# Patient Record
Sex: MALE | Race: WHITE | NOT HISPANIC OR LATINO | Employment: FULL TIME | URBAN - METROPOLITAN AREA
[De-identification: names, ages, dates, MRNs, and addresses within clinical notes are randomized per-mention and may not be internally consistent; named-entity substitution may affect disease eponyms.]

---

## 2018-01-10 ENCOUNTER — APPOINTMENT (OUTPATIENT)
Dept: GENERAL RADIOLOGY | Facility: HOSPITAL | Age: 23
End: 2018-01-10

## 2018-01-10 ENCOUNTER — HOSPITAL ENCOUNTER (EMERGENCY)
Facility: HOSPITAL | Age: 23
Discharge: HOME | End: 2018-01-10
Attending: EMERGENCY MEDICINE | Admitting: EMERGENCY MEDICINE

## 2018-01-10 VITALS
HEART RATE: 66 BPM | BODY MASS INDEX: 26.52 KG/M2 | TEMPERATURE: 98.1 F | WEIGHT: 175 LBS | DIASTOLIC BLOOD PRESSURE: 66 MMHG | SYSTOLIC BLOOD PRESSURE: 138 MMHG | RESPIRATION RATE: 15 BRPM | HEIGHT: 68 IN | OXYGEN SATURATION: 98 %

## 2018-01-10 DIAGNOSIS — S93.491A SPRAIN OF ANTERIOR TALOFIBULAR LIGAMENT OF RIGHT ANKLE, INITIAL ENCOUNTER: Primary | ICD-10-CM

## 2018-01-10 PROCEDURE — 73610 X-RAY EXAM OF ANKLE: CPT

## 2018-01-10 PROCEDURE — 99283 EMERGENCY DEPT VISIT LOW MDM: CPT

## 2018-01-10 RX ORDER — HYDROCODONE BITARTRATE AND ACETAMINOPHEN 5; 325 MG/1; MG/1
1 TABLET ORAL EVERY 6 HOURS PRN
Qty: 15 TABLET | Refills: 0 | OUTPATIENT
Start: 2018-01-10 | End: 2021-01-12

## 2018-01-10 RX ORDER — IBUPROFEN 800 MG/1
800 TABLET ORAL ONCE
Status: COMPLETED | OUTPATIENT
Start: 2018-01-10 | End: 2018-01-10

## 2018-01-10 RX ORDER — HYDROCODONE BITARTRATE AND ACETAMINOPHEN 7.5; 325 MG/1; MG/1
1 TABLET ORAL ONCE
Status: DISCONTINUED | OUTPATIENT
Start: 2018-01-10 | End: 2018-01-10

## 2018-01-10 RX ADMIN — IBUPROFEN 800 MG: 800 TABLET, FILM COATED ORAL at 11:07

## 2018-01-10 NOTE — ED TRIAGE NOTES
"Pt reports right foot injury yesterday while working, states he \"rolled it or snapped it.\" pt c/o swelling, bruising and pain  "

## 2018-01-10 NOTE — ED PROVIDER NOTES
EMERGENCY DEPARTMENT ENCOUNTER    CHIEF COMPLAINT  Chief Complaint: R ankle pain  History given by: Pt  History limited by: Nothing  Room Number: Room/bed info not found  PMD: No Known Provider      HPI:  Pt is a 22 y.o. male who presents complaining of R ankle pain, onset yesterday at work. Pt reports he was going down a ladder when he missed a rung and twisted his R ankle. He denies any numbness or tingling to the area. He reports that he was able to ambulate after the incident.     Duration:  Yesterday  Onset: sudden  Timing: constant  Location: R ankle  Radiation: none  Quality: pain  Intensity/Severity: mild  Progression: unchanged  Associated Symptoms: none stated  Aggravating Factors: movement  Alleviating Factors: none  Previous Episodes: none  Treatment before arrival: None stated PTA    PAST MEDICAL HISTORY  Active Ambulatory Problems     Diagnosis Date Noted   • No Active Ambulatory Problems     Resolved Ambulatory Problems     Diagnosis Date Noted   • No Resolved Ambulatory Problems     Past Medical History:   Diagnosis Date   • Allergic        PAST SURGICAL HISTORY  Past Surgical History:   Procedure Laterality Date   • BLADDER SURGERY     • BLADDER SURGERY      obstruction   • KNEE ACL RECONSTRUCTION Left    • KNEE ACL RECONSTRUCTION         FAMILY HISTORY  Family History   Problem Relation Age of Onset   • No Known Problems Mother    • No Known Problems Father        SOCIAL HISTORY  Social History     Social History   • Marital status: Single     Spouse name: N/A   • Number of children: N/A   • Years of education: N/A     Occupational History   • Not on file.     Social History Main Topics   • Smoking status: Never Smoker   • Smokeless tobacco: Former User   • Alcohol use Yes   • Drug use: No   • Sexual activity: Yes     Partners: Female     Other Topics Concern   • Not on file     Social History Narrative       ALLERGIES  Morphine and related    REVIEW OF SYSTEMS  Review of Systems   Constitutional:  Negative for fever.   Respiratory: Negative for shortness of breath.    Cardiovascular: Negative for chest pain.   Musculoskeletal: Positive for arthralgias (R ankle pain).   Neurological: Negative for numbness.        Pt denies any tingling in his RLE       PHYSICAL EXAM  ED Triage Vitals   Temp Heart Rate Resp BP SpO2   01/10/18 0845 01/10/18 0845 01/10/18 0845 01/10/18 0849 01/10/18 0845   97.2 °F (36.2 °C) 75 16 138/66 97 %      Temp src Heart Rate Source Patient Position BP Location FiO2 (%)   01/10/18 0845 -- -- -- --   Tympanic           Physical Exam   Constitutional: No distress.   HENT:   Head: Normocephalic and atraumatic.   Eyes: EOM are normal. Pupils are equal, round, and reactive to light.   Musculoskeletal:        Right ankle: He exhibits decreased range of motion (with inversion), swelling and ecchymosis (laterally). He exhibits no deformity and no laceration. Tenderness. Lateral malleolus and medial malleolus tenderness found.   No R knee or R tibial tenderness   Neurological: He is alert. He has normal sensation.   Skin: Skin is warm and dry.   Nursing note and vitals reviewed.      RADIOLOGY  XR Ankle 3+ View Right     Soft tissue swelling, but no acute fracture.           I ordered the above noted radiological studies. Interpreted by radiologist. Reviewed by me in PACS.       PROCEDURES  Procedures      PROGRESS AND CONSULTS  ED Course     1052 - Norco ordered for pain.     1053 - Rechecked pt. Pt is resting comfortably. Informed pt of the XR which shows soft tissue swelling but no acute fracture. D/w pt the plan to discharge home with an orthopedic boot and pain medication and a follow up with Dr. Mooney (Orthopedics). Pt understands and agrees with plan. All questions answered.     1055 - Walking boot ordered.     MEDICAL DECISION MAKING  Results were reviewed/discussed with the patient and they were also made aware of online access. Pt also made aware that some labs, such as cultures, will not  be resulted during ER visit and follow up with PMD is necessary.     MDM  Number of Diagnoses or Management Options  Sprain of anterior talofibular ligament of right ankle, initial encounter:      Amount and/or Complexity of Data Reviewed  Tests in the radiology section of CPT®: ordered and reviewed (XR R ankle - Soft tissue swelling, but no acute fracture. )           DIAGNOSIS  Final diagnoses:   Sprain of anterior talofibular ligament of right ankle, initial encounter       DISPOSITION  DISCHARGE    Patient discharged in stable condition.    Reviewed implications of results, diagnosis, meds, responsibility to follow up, warning signs and symptoms of possible worsening, potential complications and reasons to return to ER.    Patient/Family voiced understanding of above instructions.    Discussed plan for discharge, as there is no emergent indication for admission.  Pt/family is agreeable and understands need for follow up and repeat testing.  Pt is aware that discharge does not mean that nothing is wrong but it indicates no emergency is present that requires admission and they must continue care with follow-up as given below or physician of their choice.     FOLLOW-UP  RANJANA Mooney MD  [address] [address]  [phone]    Schedule an appointment as soon as possible for a visit           Medication List      New Prescriptions          HYDROcodone-acetaminophen 5-325 MG per tablet   Commonly known as:  NORCO   Take 1 tablet by mouth Every 6 (Six) Hours As Needed for Moderate Pain .         Stop          Loratadine 10 MG capsule       MethylPREDNISolone 4 MG tablet   Commonly known as:  MEDROL (PATSY)       predniSONE 10 MG tablet   Commonly known as:  DELTASONE               Latest Documented Vital Signs:  As of 10:57 AM  BP- 138/66 HR- 75 Temp- 97.2 °F (36.2 °C) (Tympanic) O2 sat- 97%    --  Documentation assistance provided by ramon Curtis for Dr. Castro.  Information  recorded by the scribe was done at my direction and has been verified and validated by me.            Jayce Curtis  01/10/18 1053       Chun Castro MD  01/10/18 6192

## 2018-01-10 NOTE — ED NOTES
Pt to ED for right ankle injury yesterday, states rolled his ankle at work. Pt has bruising and swelling to right ankle. Skin p/w/d, pedal pulses palpable     Candie Desai RN  01/10/18 0993

## 2018-01-19 ENCOUNTER — APPOINTMENT (OUTPATIENT)
Dept: GENERAL RADIOLOGY | Facility: HOSPITAL | Age: 23
End: 2018-01-19

## 2018-01-19 PROCEDURE — 73630 X-RAY EXAM OF FOOT: CPT | Performed by: GENERAL PRACTICE

## 2018-01-19 PROCEDURE — 73610 X-RAY EXAM OF ANKLE: CPT | Performed by: GENERAL PRACTICE

## 2018-08-19 ENCOUNTER — APPOINTMENT (OUTPATIENT)
Dept: GENERAL RADIOLOGY | Facility: HOSPITAL | Age: 23
End: 2018-08-19

## 2018-08-19 PROCEDURE — 73140 X-RAY EXAM OF FINGER(S): CPT | Performed by: FAMILY MEDICINE

## 2022-08-05 ENCOUNTER — PATIENT ROUNDING (BHMG ONLY) (OUTPATIENT)
Dept: SPORTS MEDICINE | Facility: CLINIC | Age: 27
End: 2022-08-05

## 2022-08-05 ENCOUNTER — OFFICE VISIT (OUTPATIENT)
Dept: SPORTS MEDICINE | Facility: CLINIC | Age: 27
End: 2022-08-05

## 2022-08-05 VITALS
HEART RATE: 86 BPM | SYSTOLIC BLOOD PRESSURE: 137 MMHG | BODY MASS INDEX: 31.07 KG/M2 | HEIGHT: 68 IN | WEIGHT: 205 LBS | TEMPERATURE: 98.1 F | OXYGEN SATURATION: 97 % | DIASTOLIC BLOOD PRESSURE: 79 MMHG

## 2022-08-05 DIAGNOSIS — S93.412A SPRAIN OF CALCANEOFIBULAR LIGAMENT OF LEFT ANKLE, INITIAL ENCOUNTER: ICD-10-CM

## 2022-08-05 DIAGNOSIS — S93.492A SPRAIN OF ANTERIOR TALOFIBULAR LIGAMENT OF LEFT ANKLE, INITIAL ENCOUNTER: Primary | ICD-10-CM

## 2022-08-05 DIAGNOSIS — M76.72 PERONEAL TENDINITIS OF LEFT LOWER EXTREMITY: ICD-10-CM

## 2022-08-05 PROCEDURE — 99213 OFFICE O/P EST LOW 20 MIN: CPT | Performed by: STUDENT IN AN ORGANIZED HEALTH CARE EDUCATION/TRAINING PROGRAM

## 2022-08-05 NOTE — PROGRESS NOTES
August 5, 2022    A Welcome Card has been sent to the patient for PATIENT ROUNDING with [facility]

## 2022-08-08 NOTE — PROGRESS NOTES
"  Chief Complaint   Patient presents with   • Left Ankle - Initial Evaluation     Injury 8/3/22 playing softball       History of Present Illness  Fab is a 27 y.o. year old male  here today for left ankle pain.  Injury occurred on 8/3/2022 while playing softball when he slid into third base and rolled his ankle.  His lead foot (L) got caught on the bag causing an inversion and hyper-plantarflexion injury.  He reported having pain immediately but was able to continue playing.  Pain persisted over the next few days without significant improvement.  Pain is currently rated 2/10 and described as an aching pain worse on the inferolateral aspect.  He does admit to associated swelling and bruising, denies any numbness or tingling.  He has been wearing a lace up ankle brace that he purchased at the pharmacy which he states provides a lot of support and allows him to ambulate without significant limp.  He presents today using crutches, but states he is only been using them to the house for extended times.  He took ibuprofen 400 mg 3 times over the past couple days but nothing consistently.  He has a history of right ankle sprain along with right ACL MCL and PCL injury requiring surgery.    He currently works as an , mainly doing corporate installations.  He just had a baby girl 10 days ago so is home on parental leave, scheduled to return to work on 8/8/2022.    Review of Systems   All other systems reviewed and are negative.      /79   Pulse 86   Temp 98.1 °F (36.7 °C)   Ht 172.7 cm (68\")   Wt 93 kg (205 lb)   SpO2 97%   BMI 31.17 kg/m²        Physical Exam  Vital signs reviewed.   General: Well developed, well nourished; No acute distress.  Eyes: conjunctiva clear; pupils equally round and reactive  ENT: external ears and nose atraumatic; hearing normal  CV: no peripheral edema, 2+ distal pulses  Resp: normal respiratory effort, no use of accessory muscles  Skin: normal color and " pigmentation; no rashes or wounds; normal turgor  Psych: alert and oriented; mood and affect appropriate; recent and remote memory intact  Neuro: sensation to light touch intact    MSK Exam:  The left ankle is without obvious signs of acute bony deformity.  Swelling and ecchymosis of the ankle, with some dependent bruising at the heel.  There is no tenderness to the medial joint line, medial malleolus, or distal tibia. There is tenderness to the lateral joint line, worse posteriorly, and lateral malleolus. There is no bony crepitus or step-off. There is no midfoot or forefoot tenderness.  There is soft tissue tenderness of the posterior tibial and peroneal tendons, however area of worst tenderness is over the ATF and CF ligaments.  Active range of motion is mildly diminished when compared to the right, with pain at endrange of inversion and eversion. Instability test are negative for appreciable laxity with anterior drawer, talar tilt and eversion stress tests, however there is pain laterally with talar tilt. Tibia-fibula squeeze, calcaneal squeeze, and forefoot squeeze tests are negative. Strength is 4/5.  He is able to walk and single-leg stand, but with pain on the medial and lateral aspects. The opposite ankle is otherwise normal and stable. Gait is antalgic without the brace.  With the brace on his gait and pain improves, especially after a few steps.      Left Ankle X-Ray  Indication: Pain  Views: AP, Lateral, Mortise  Findings: There is a metallic BB noted from previous injury  No fracture  No bony lesion  Soft tissues normal  Normal joint spaces  No prior studies available for comparison.      Assessment and Plan  Diagnoses and all orders for this visit:    1. Sprain of anterior talofibular ligament of left ankle, initial encounter (Primary)    2. Sprain of calcaneofibular ligament of left ankle, initial encounter    3. Peroneal tendinitis of left lower extremity    Patient is a 27-year-old male here today  with chief complaint of left ankle pain that began after an inversion ankle injury while playing softball.  Images and exam findings reviewed with the patient and are consistent with a lateral ankle sprain.  We recommended conservative management.  He should continue with the use of his lace up ankle brace.  He may weight-bear as tolerated, but instructed him that if he has significant pain that is causing him to ambulate with a limp, he should use crutches and partially weight-bear until symptoms improve.  He should avoid strenuous activity including prolonged standing or walking.  He should ice and elevate for swelling.  Recommended the use of more regularly scheduled ibuprofen for the next 7 days then as needed.  He should work on gentle ankle range of motion exercises.  A note was provided for work allowing him to return to light duty for the next 2 weeks with instructions to not climb up on ladders or walk/stand for prolonged periods of time.  He may return to full duty without restrictions on 8/22/2022.  We will follow-up in 4 weeks or sooner as needed.  All of his questions were answered and he is agreeable with the plan.      Dictated utilizing Dragon dictation.

## 2023-05-22 ENCOUNTER — OFFICE VISIT (OUTPATIENT)
Dept: ORTHOPEDIC SURGERY | Facility: CLINIC | Age: 28
End: 2023-05-22
Payer: COMMERCIAL

## 2023-05-22 VITALS — HEIGHT: 68 IN | TEMPERATURE: 98.4 F | BODY MASS INDEX: 31.07 KG/M2 | WEIGHT: 205 LBS

## 2023-05-22 DIAGNOSIS — M25.561 RIGHT KNEE PAIN, UNSPECIFIED CHRONICITY: Primary | ICD-10-CM

## 2023-05-22 PROCEDURE — 99203 OFFICE O/P NEW LOW 30 MIN: CPT | Performed by: ORTHOPAEDIC SURGERY

## 2023-05-22 NOTE — PROGRESS NOTES
Patient: Fab Reaves    YOB: 1995    Medical Record Number: [id number]    Chief Complaints:  Right knee injury    History of Present Illness:     28 y.o. male patient who presents for evaluation of a right knee injury.  The injury occurred on May 17.  He was playing softball and rounding second base when his knee buckled and gave out.  He suffered a knee dislocation.  He was taken to [facility] and a closed reduction was performed.  They obtained a CT scan to rule out arterial injury and admitted him overnight for observation.  His knee has been in a brace since the injury and he has been using crutches with strict nonweightbearing on the right lower extremity.  He says his pain is minimal at this point.  He is just taking over-the-counter medicines for what little discomfort he has.  It really only hurts if he tries to move or walk on it.  He reports significant swelling.  He reports normal motor and sensory function in his foot.  Of note he has a history of a previous left knee dislocation but when I asked him to describe this it sounds like it was probably a patellar dislocation.  He does have a history of a previous left knee ACL reconstruction as well.  He never had problems with the right knee until the recent injury.  Denies any other injuries or complaints.    Allergies:   Allergies   Allergen Reactions   • Morphine And Related Arrhythmia       Home Medications:      Current Outpatient Medications:   •  ibuprofen (ADVIL,MOTRIN) 200 MG tablet, Take 200 mg by mouth every 6 (six) hours as needed for mild pain (1-3)., Disp: , Rfl:     Past Medical History:   Diagnosis Date   • Allergic        Past Surgical History:   Procedure Laterality Date   • BLADDER SURGERY     • BLADDER SURGERY      obstruction   • KNEE ACL RECONSTRUCTION Left    • KNEE ACL RECONSTRUCTION         Social History     Occupational History   • Not on file   Tobacco Use   • Smoking status: Never   • Smokeless tobacco:  "Former   Substance and Sexual Activity   • Alcohol use: Yes   • Drug use: No   • Sexual activity: Yes     Partners: Female      Social History     Social History Narrative   • Not on file       Family History   Problem Relation Age of Onset   • No Known Problems Mother    • No Known Problems Father        Review of Systems:      Constitutional: Denies fever, shaking or chills   Eyes: Denies change in visual acuity   HEENT: Denies nasal congestion or sore throat   Respiratory: Denies cough or shortness of breath   Cardiovascular: Denies chest pain or edema  Endocrine: Denies tremors, palpitations, intolerance of heat or cold, polyuria, polydipsia.  GI: Denies abdominal pain, nausea, vomiting, bloody stools or diarrhea  : Denies frequency, urgency, incontinence, retention, or nocturia.  Musculoskeletal: Denies numbness, tingling or loss of motor function except as above  Integument: Denies rash, lesion or ulceration   Neurologic: Denies headache or focal weakness, deficits  Heme: Denies spontaneous or excessive bleeding, epistaxis, hematuria, melena, fatigue, enlarged or tender lymph nodes.      All other pertinent positives and negatives as noted above in HPI.    Physical Exam: 28 y.o. male    Vitals:    05/22/23 1313   Temp: 98.4 °F (36.9 °C)   TempSrc: Temporal   Weight: 93 kg (205 lb)   Height: 172.7 cm (67.99\")       General:  Patient is awake and alert.  Appears in no acute distress or discomfort.    Psych:  Affect and demeanor are appropriate.    Eyes:  Conjunctiva and sclera appear grossly normal.  Eyes track well and EOM seem to be intact.    Ears:  No gross abnormalities.  Hearing adequate for the exam.    Cardiovascular:  Regular rate and rhythm.    Lungs:  Good chest expansion.  Breathing unlabored.    Lymph:  No palpable masses or adenopathy in the right lower extremity    Extremities: His right knee is examined.  He has a massive effusion.  Skin is benign and intact.  His calf is just a little " edematous but not at all firm.  He has a negative Homans.  He is markedly tender along the medial aspect of his knee, less so along the lateral side.  I did not take the brace off or perform any extensive ligamentous exam today.  He can plantarflex and dorsiflex his ankle and toes with good strength.  He reports intact sensation.  He has palpable posterior tibial and dorsalis pedis pulses.  He has good skin turgor and brisk capillary refill.         Radiology:   His outside x-rays of the right knee including pre and postreduction films are reviewed.  He has what appears to be a knee dislocation which was subsequently reduced.    Assessment/Plan: Recent right knee dislocation with probable multiligamentous injury    I am going to send him for an MRI.  I will call him as soon as I see the results.  I recommend he continue use of the brace for now.  I recommend he continue use of the crutches and strict nonweightbearing.    Timi Blair MD    05/22/2023

## 2023-05-23 ENCOUNTER — TELEPHONE (OUTPATIENT)
Dept: ORTHOPEDIC SURGERY | Facility: CLINIC | Age: 28
End: 2023-05-23

## 2023-05-23 NOTE — TELEPHONE ENCOUNTER
Caller: FALLON DANIELSON    Relationship: SE;F    Best call back number: [phone]    What form or medical record are you requesting: MRI RIGHT KNEE WITHOUT CONTRAST    Who is requesting this form or medical record from you: [facility]    How would you like to receive the form or medical records (pick-up, mail, fax): ON FILE      Timeframe paperwork needed: ASAP    Additional notes: [facility] HAS NOT RECEIVED ORDER.

## 2023-05-24 NOTE — TELEPHONE ENCOUNTER
Caller: Fab Reaves    Relationship to patient: Self    Best call back number:     Patient is needing: UNABLE TO WARM TRANSFER.  PATIENT IS TRYING  TO REACH SAADIA TO GET MRI SCHEDULED AND IS FRUSTRATED BECAUSE HE STATED HE HASNT BEEN CONTACTED AND HE WANTS TO GET SCHEDULE RIGHT AWAY.  WOULD LIKE TO SPEAK WITH JONATHAN.  IF WE CAN GET ORDER SENT AND CONTACT HIM TO LET HIM KNOW ITS DONE THAT SHOULD HELP SITUATION

## 2023-05-24 NOTE — TELEPHONE ENCOUNTER
Caller: Fab Reaves    Relationship to patient: Self    Best call back number: [phone]    Patient is needing: PATIENT IS NEEDING TO SPEAK TO SOMEONE IN THE CLINICAL STAFF ABOUT HIS MRI OF HIS RIGHT KNEE WITHOUT CONTRAST.  PATIENT IS UPSET AND NEEDS TO SPEAK TO SOMEONE, BECAUSE [facility] HASN'T BEEN RECEIVED. PATIENT NEEDS A CALL BACK.  UNABLE TO WARM TRANSFER.

## 2023-05-24 NOTE — TELEPHONE ENCOUNTER
Caller: Fab Reaves    Relationship to patient: Self    Best call back number: [phone]    Patient is needing: PATIENT WAS CALLING WANTING TO TALK TO SAADIA OR THE PRACTICE MANAGER TO DISCUSS HIS MRI GETTING SCHEDULED AT [facility]. PATIENT'S MRI ORDER WAS PUT IN TWO DAYS AGO BUT HE IS TRYING TO GET THIS SCHEDULED ASAP. PATIENT WOULD LIKE A CALL BACK TO DISCUSS THIS. THANK YOU!

## 2023-05-25 ENCOUNTER — PATIENT ROUNDING (BHMG ONLY) (OUTPATIENT)
Dept: ORTHOPEDIC SURGERY | Facility: CLINIC | Age: 28
End: 2023-05-25
Payer: COMMERCIAL

## 2023-05-25 DIAGNOSIS — M25.561 RIGHT KNEE PAIN, UNSPECIFIED CHRONICITY: ICD-10-CM

## 2023-05-25 NOTE — PROGRESS NOTES
A Share0 Message has been sent to the patient for PATIENT ROUNDING with [facility]

## 2023-05-26 ENCOUNTER — TELEPHONE (OUTPATIENT)
Dept: ORTHOPEDIC SURGERY | Facility: CLINIC | Age: 28
End: 2023-05-26
Payer: COMMERCIAL

## 2023-05-26 NOTE — TELEPHONE ENCOUNTER
Have made the patient an appointment to see Dr. Angel España with [facility] sports medicine clinic on Wednesday, May 31 at 130.  Office notes and MRI report as well as CT report has been faxed to Dr. España's office at [phone].  All the information has been given to the patient.  He is also been instructed to  his MRI disc from [facility] as well as his CD with x-rays and CT from [facility] to take with him to the appointment.

## 2023-05-26 NOTE — TELEPHONE ENCOUNTER
I called him about the MRI results.  I explained the findings.  He has a complex multiligamentous injury.  I am happy to help him but I would plan for a multistage procedure.  I would probably let the MCL heal and then plan to address his PCL and meniscus and then I would probably follow that up with ACL reconstruction.  I told him that the alternative would be to send him to [place].  I explained that one of the world experts in multiligamentous knee reconstructions is Dr. España.  We can get him over to Dr. España for further care.  He would prefer to go that route.  I will have my office make the necessary arrangements.

## 2023-07-26 ENCOUNTER — TREATMENT (OUTPATIENT)
Dept: PHYSICAL THERAPY | Facility: CLINIC | Age: 28
End: 2023-07-26
Payer: COMMERCIAL

## 2023-07-26 DIAGNOSIS — Z98.890 S/P ACL RECONSTRUCTION: Primary | ICD-10-CM

## 2023-07-26 DIAGNOSIS — R29.898 DECREASED STRENGTH OF LOWER EXTREMITY: ICD-10-CM

## 2023-07-26 DIAGNOSIS — R26.89 IMPAIRED GAIT AND MOBILITY: ICD-10-CM

## 2023-07-26 DIAGNOSIS — S89.91XD PCL INJURY, RIGHT, SUBSEQUENT ENCOUNTER: ICD-10-CM

## 2023-07-26 DIAGNOSIS — Z98.890 S/P LATERAL MENISCUS REPAIR OF RIGHT KNEE: ICD-10-CM

## 2023-07-26 DIAGNOSIS — Z98.890 S/P MCL REPAIR: ICD-10-CM

## 2023-07-26 DIAGNOSIS — M25.661 DECREASED RANGE OF MOTION (ROM) OF RIGHT KNEE: ICD-10-CM

## 2023-07-26 DIAGNOSIS — Z98.890 S/P MEDIAL MENISCUS REPAIR OF RIGHT KNEE: ICD-10-CM

## 2023-07-26 NOTE — PROGRESS NOTES
Physical Therapy Progress Note    [facility]  [address]  [address]  [place] [address]      Patient: Fab Reaves   : 1995  Referring practitioner: Angel España MD  Date of Initial Visit: Type: THERAPY  Noted: 2023  Today's Date: 2023  Patient seen for 11 sessions         Fab Reaves reports: he's doing well with the new brace, just getting used to putting more weight on it.        Subjective     Objective   See Exercise, Manual, and Modality Logs for complete treatment.       Assessment/Plan  Fab continues to be limited to 80 degrees of AAROM knee flexion and 4-5 degrees away from full knee extension. He exhibits mild quad lag with straight leg raise and needs cueing for heel to toe gait pattern during level surface ambulation. We will continue to work on progressing his gait since he is now WBAT, he could tolerate ambulation with one crutch well today. HEP updated with extensive knee ROM exercises today.  Visit Diagnoses:    ICD-10-CM ICD-9-CM   1. S/P ACL reconstruction  Z98.890 V45.89   2. S/P MCL repair  Z98.890 V45.89   3. Decreased range of motion (ROM) of right knee  M25.661 719.56   4. PCL injury, right, subsequent encounter  S89.91XD V58.89     959.7   5. S/P medial meniscus repair of right knee  Z98.890 V45.89   6. S/P lateral meniscus repair of right knee  Z98.890 V45.89   7. Decreased strength of lower extremity  R29.898 729.89   8. Impaired gait and mobility  R26.89 781.2       Progress per Plan of Care           Timed:  Manual Therapy:    10     mins  78176;  Therapeutic Exercise:    25     mins  72694;     Neuromuscular Pollo:        mins  82057;    Therapeutic Activity:    10      mins  44164;     Gait Training:      10     mins  84505;     Ultrasound:          mins  89956;    Electrical Stimulation:         mins  82700 ( );    Untimed:  Electrical Stimulation:         mins  34472 ( );  Mechanical Traction:         mins   82518;     Timed Treatment:   55   mins   Total Treatment:     55   mins  Gerald Lyn PT, DPT, OCS  Physical Therapist   [place] License #[id number]

## 2023-07-26 NOTE — PATIENT INSTRUCTIONS
Access Code: [email or web address]  URL: [email or web address]  Date: 07/26/2023  Prepared by: Gerald Lyn    Exercises  - Standing Knee Flexion Stretch on Step  - 3-5 x daily - 7 x weekly - 3 sets - 10 reps  - Prone Quadriceps Stretch with Strap  - 3-5 x daily - 7 x weekly - 3 sets - 10 reps  - Sitting Heel Slide with Towel  - 3-5 x daily - 7 x weekly - 3 sets - 10 reps  - Seated Knee Flexion AAROM  - 3-5 x daily - 7 x weekly - 3 sets - 10 reps  - Seated Knee Flexion AAROM  - 3-5 x daily - 7 x weekly - 3 sets - 10 reps  - Prone Knee Flexion AROM  - 3-5 x daily - 7 x weekly - 3 sets - 10 reps  - Prone Terminal Knee Extension  - 3-5 x daily - 7 x weekly - 2 sets - 10 reps - 3-5 hold  - Supine Quadriceps Stretch with Strap on Table  - 3-5 x daily - 7 x weekly - 3 sets - 20 hold

## 2023-07-28 ENCOUNTER — TREATMENT (OUTPATIENT)
Dept: PHYSICAL THERAPY | Facility: CLINIC | Age: 28
End: 2023-07-28
Payer: COMMERCIAL

## 2023-07-28 DIAGNOSIS — Z98.890 S/P ACL RECONSTRUCTION: Primary | ICD-10-CM

## 2023-07-28 DIAGNOSIS — Z98.890 S/P MEDIAL MENISCUS REPAIR OF RIGHT KNEE: ICD-10-CM

## 2023-07-28 DIAGNOSIS — Z98.890 S/P LATERAL MENISCUS REPAIR OF RIGHT KNEE: ICD-10-CM

## 2023-07-28 DIAGNOSIS — R26.89 IMPAIRED GAIT AND MOBILITY: ICD-10-CM

## 2023-07-28 DIAGNOSIS — M25.661 DECREASED RANGE OF MOTION (ROM) OF RIGHT KNEE: ICD-10-CM

## 2023-07-28 DIAGNOSIS — R29.898 DECREASED STRENGTH OF LOWER EXTREMITY: ICD-10-CM

## 2023-07-28 DIAGNOSIS — S89.91XD PCL INJURY, RIGHT, SUBSEQUENT ENCOUNTER: ICD-10-CM

## 2023-07-28 DIAGNOSIS — Z98.890 S/P MCL REPAIR: ICD-10-CM

## 2023-07-28 PROCEDURE — 97110 THERAPEUTIC EXERCISES: CPT | Performed by: PHYSICAL THERAPIST

## 2023-07-28 PROCEDURE — 97530 THERAPEUTIC ACTIVITIES: CPT | Performed by: PHYSICAL THERAPIST

## 2023-07-28 PROCEDURE — 97140 MANUAL THERAPY 1/> REGIONS: CPT | Performed by: PHYSICAL THERAPIST

## 2023-08-02 ENCOUNTER — TREATMENT (OUTPATIENT)
Dept: PHYSICAL THERAPY | Facility: CLINIC | Age: 28
End: 2023-08-02
Payer: COMMERCIAL

## 2023-08-02 DIAGNOSIS — R26.89 IMPAIRED GAIT AND MOBILITY: ICD-10-CM

## 2023-08-02 DIAGNOSIS — Z98.890 S/P MCL REPAIR: ICD-10-CM

## 2023-08-02 DIAGNOSIS — S89.91XD PCL INJURY, RIGHT, SUBSEQUENT ENCOUNTER: ICD-10-CM

## 2023-08-02 DIAGNOSIS — Z98.890 S/P LATERAL MENISCUS REPAIR OF RIGHT KNEE: ICD-10-CM

## 2023-08-02 DIAGNOSIS — Z98.890 S/P ACL RECONSTRUCTION: Primary | ICD-10-CM

## 2023-08-02 DIAGNOSIS — M25.661 DECREASED RANGE OF MOTION (ROM) OF RIGHT KNEE: ICD-10-CM

## 2023-08-02 DIAGNOSIS — Z98.890 S/P MEDIAL MENISCUS REPAIR OF RIGHT KNEE: ICD-10-CM

## 2023-08-02 DIAGNOSIS — R29.898 DECREASED STRENGTH OF LOWER EXTREMITY: ICD-10-CM

## 2023-08-04 ENCOUNTER — TREATMENT (OUTPATIENT)
Dept: PHYSICAL THERAPY | Facility: CLINIC | Age: 28
End: 2023-08-04
Payer: COMMERCIAL

## 2023-08-04 DIAGNOSIS — Z98.890 S/P MCL REPAIR: ICD-10-CM

## 2023-08-04 DIAGNOSIS — M25.661 DECREASED RANGE OF MOTION (ROM) OF RIGHT KNEE: ICD-10-CM

## 2023-08-04 DIAGNOSIS — S89.91XD PCL INJURY, RIGHT, SUBSEQUENT ENCOUNTER: ICD-10-CM

## 2023-08-04 DIAGNOSIS — Z98.890 S/P LATERAL MENISCUS REPAIR OF RIGHT KNEE: ICD-10-CM

## 2023-08-04 DIAGNOSIS — R26.89 IMPAIRED GAIT AND MOBILITY: ICD-10-CM

## 2023-08-04 DIAGNOSIS — Z98.890 S/P ACL RECONSTRUCTION: Primary | ICD-10-CM

## 2023-08-04 DIAGNOSIS — R29.898 DECREASED STRENGTH OF LOWER EXTREMITY: ICD-10-CM

## 2023-08-04 DIAGNOSIS — Z98.890 S/P MEDIAL MENISCUS REPAIR OF RIGHT KNEE: ICD-10-CM

## 2023-08-04 PROCEDURE — 97530 THERAPEUTIC ACTIVITIES: CPT | Performed by: PHYSICAL THERAPIST

## 2023-08-04 PROCEDURE — 97116 GAIT TRAINING THERAPY: CPT | Performed by: PHYSICAL THERAPIST

## 2023-08-04 PROCEDURE — 97110 THERAPEUTIC EXERCISES: CPT | Performed by: PHYSICAL THERAPIST

## 2023-08-04 NOTE — PROGRESS NOTES
Physical Therapy Progress Note    [facility]  [address]  [address]  [place] [address]      Patient: Fab Reaves   : 1995  Referring practitioner: Angel España MD  Date of Initial Visit: Type: THERAPY  Noted: 2023  Today's Date: 2023  Patient seen for 14 sessions         Fab Reaves reports: he is comfortable walking without the crutches at this point. He was helping to clean around the house and is still doing his HEP at home, he feels like his knee isn't as swollen.        Subjective     Objective   See Exercise, Manual, and Modality Logs for complete treatment.       Assessment/Plan  Fab continues to guard excessively during PROM stretching and could only achieve 85 degrees of flexion today. He needs cueing for heel to toe gait pattern and to achieve terminal knee extension during stance.  Visit Diagnoses:    ICD-10-CM ICD-9-CM   1. S/P ACL reconstruction  Z98.890 V45.89   2. Decreased strength of lower extremity  R29.898 729.89   3. S/P lateral meniscus repair of right knee  Z98.890 V45.89   4. Decreased range of motion (ROM) of right knee  M25.661 719.56   5. Impaired gait and mobility  R26.89 781.2   6. S/P MCL repair  Z98.890 V45.89   7. PCL injury, right, subsequent encounter  S89.91XD V58.89     959.7   8. S/P medial meniscus repair of right knee  Z98.890 V45.89       Progress per Plan of Care           Timed:  Manual Therapy:         mins  51305;  Therapeutic Exercise:    10     mins  10504;     Neuromuscular Pollo:        mins  88063;    Therapeutic Activity:     25     mins  81860;     Gait Training:      10     mins  98044;     Ultrasound:          mins  35389;    Electrical Stimulation:         mins  83323 ( );    Untimed:  Electrical Stimulation:         mins  77877 ( );  Mechanical Traction:         mins  26328;     Timed Treatment:   45   mins   Total Treatment:     45   mins  Gerald Lyn PT, DPT, OCS  Physical  Therapist   [place] License #[id number]

## 2023-08-09 ENCOUNTER — TREATMENT (OUTPATIENT)
Dept: PHYSICAL THERAPY | Facility: CLINIC | Age: 28
End: 2023-08-09
Payer: COMMERCIAL

## 2023-08-09 DIAGNOSIS — S89.91XD PCL INJURY, RIGHT, SUBSEQUENT ENCOUNTER: ICD-10-CM

## 2023-08-09 DIAGNOSIS — M25.661 DECREASED RANGE OF MOTION (ROM) OF RIGHT KNEE: ICD-10-CM

## 2023-08-09 DIAGNOSIS — Z98.890 S/P MEDIAL MENISCUS REPAIR OF RIGHT KNEE: ICD-10-CM

## 2023-08-09 DIAGNOSIS — Z98.890 S/P LATERAL MENISCUS REPAIR OF RIGHT KNEE: ICD-10-CM

## 2023-08-09 DIAGNOSIS — Z98.890 S/P MCL REPAIR: ICD-10-CM

## 2023-08-09 DIAGNOSIS — R29.898 DECREASED STRENGTH OF LOWER EXTREMITY: ICD-10-CM

## 2023-08-09 DIAGNOSIS — R26.89 IMPAIRED GAIT AND MOBILITY: ICD-10-CM

## 2023-08-09 DIAGNOSIS — Z98.890 S/P ACL RECONSTRUCTION: Primary | ICD-10-CM

## 2023-08-09 NOTE — PROGRESS NOTES
Physical Therapy Progress Note    [facility]  [address]  [address]  [place] [address]      Patient: Fab Reaves   : 1995  Referring practitioner: Angel España MD  Date of Initial Visit: Type: THERAPY  Noted: 2023  Today's Date: 2023  Patient seen for 15 sessions         Fab Reaves reports: he's working really hard at home with bending it, he has no pain so it feels good otherwise, but he feels like he's stuck as far as ROM goes.        Subjective     Objective   See Exercise, Manual, and Modality Logs for complete treatment.       Assessment/Plan  Fab continues to guard heavily, and cannot tolerate PROM stretching of R knee flexion past 85 degrees without pain and compensatory movement such as hip hiking. PT is pursuing a dynasplint for low load, long duration stretch that patient may tolerate better. Therapist stressed importance of intense stretching at home and multiple bouts of stretching per day.  Visit Diagnoses:    ICD-10-CM ICD-9-CM   1. S/P ACL reconstruction  Z98.890 V45.89   2. Decreased strength of lower extremity  R29.898 729.89   3. S/P lateral meniscus repair of right knee  Z98.890 V45.89   4. S/P medial meniscus repair of right knee  Z98.890 V45.89   5. Decreased range of motion (ROM) of right knee  M25.661 719.56   6. Impaired gait and mobility  R26.89 781.2   7. S/P MCL repair  Z98.890 V45.89   8. PCL injury, right, subsequent encounter  S89.91XD V58.89     959.7       Progress per Plan of Care           Timed:  Manual Therapy:         mins  67736;  Therapeutic Exercise:    10     mins  94850;     Neuromuscular Pollo:    10    mins  84736;    Therapeutic Activity:     25     mins  88955;     Gait Training:           mins  86386;     Ultrasound:          mins  16667;    Electrical Stimulation:         mins  09376 ( );    Untimed:  Electrical Stimulation:         mins  39577 ( );  Mechanical Traction:         mins  53260;      Timed Treatment:   45   mins   Total Treatment:     45   mins  Gerald Lyn PT, DPT, OCS  Physical Therapist   [place] License #[id number]

## 2023-08-11 ENCOUNTER — TREATMENT (OUTPATIENT)
Dept: PHYSICAL THERAPY | Facility: CLINIC | Age: 28
End: 2023-08-11
Payer: COMMERCIAL

## 2023-08-11 DIAGNOSIS — S89.91XD PCL INJURY, RIGHT, SUBSEQUENT ENCOUNTER: Primary | ICD-10-CM

## 2023-08-11 DIAGNOSIS — R26.89 IMPAIRED GAIT AND MOBILITY: ICD-10-CM

## 2023-08-11 DIAGNOSIS — Z98.890 S/P LATERAL MENISCUS REPAIR OF RIGHT KNEE: ICD-10-CM

## 2023-08-11 DIAGNOSIS — R29.898 DECREASED STRENGTH OF LOWER EXTREMITY: ICD-10-CM

## 2023-08-11 DIAGNOSIS — Z98.890 S/P MEDIAL MENISCUS REPAIR OF RIGHT KNEE: ICD-10-CM

## 2023-08-11 DIAGNOSIS — Z98.890 S/P MCL REPAIR: ICD-10-CM

## 2023-08-11 DIAGNOSIS — Z98.890 S/P ACL RECONSTRUCTION: ICD-10-CM

## 2023-08-11 DIAGNOSIS — M25.661 DECREASED RANGE OF MOTION (ROM) OF RIGHT KNEE: ICD-10-CM

## 2023-08-11 NOTE — PROGRESS NOTES
Physical Therapy Progress Note    [facility]  [address]  [address]  [place] [address]      Patient: Fab Reaves   : 1995  Referring practitioner: Angel España MD  Date of Initial Visit: Type: THERAPY  Noted: 2023  Today's Date: 2023  Patient seen for 16 sessions         Fab Reaves reports: he's working it a lot at home, he thinks the swelling is better today.        Subjective     Objective   See Exercise, Manual, and Modality Logs for complete treatment.       Assessment/Plan  Fab could achieve 91 degrees of AAROM R knee flexion with over pressure from therapist. His knee edema is improved today and he is tolerating closed chain strengthening with no pain. His gait is stiff with decreased knee flexion in swing, but his pelvic control is good. Letter faxed to MD about Dynasplint today.  Visit Diagnoses:    ICD-10-CM ICD-9-CM   1. PCL injury, right, subsequent encounter  S89.91XD V58.89     959.7   2. S/P ACL reconstruction  Z98.890 V45.89   3. Decreased strength of lower extremity  R29.898 729.89   4. S/P lateral meniscus repair of right knee  Z98.890 V45.89   5. S/P medial meniscus repair of right knee  Z98.890 V45.89   6. Decreased range of motion (ROM) of right knee  M25.661 719.56   7. Impaired gait and mobility  R26.89 781.2   8. S/P MCL repair  Z98.890 V45.89       Progress per Plan of Care           Timed:  Manual Therapy:         mins  94456;  Therapeutic Exercise:    15     mins  06601;     Neuromuscular Pollo:    15    mins  44318;    Therapeutic Activity:     15     mins  92475;     Gait Training:           mins  48071;     Ultrasound:          mins  29360;    Electrical Stimulation:        mins  30108 ( );    Untimed:  Electrical Stimulation:         mins  62323 ( );  Mechanical Traction:         mins  02556;     Timed Treatment:   45   mins   Total Treatment:     45   mins  Gerald Lyn PT, DPT, OCS  Physical Therapist    [place] License #[id number]

## 2023-08-16 ENCOUNTER — TREATMENT (OUTPATIENT)
Dept: PHYSICAL THERAPY | Facility: CLINIC | Age: 28
End: 2023-08-16
Payer: COMMERCIAL

## 2023-08-16 DIAGNOSIS — M25.661 DECREASED RANGE OF MOTION (ROM) OF RIGHT KNEE: ICD-10-CM

## 2023-08-16 DIAGNOSIS — Z98.890 S/P ACL RECONSTRUCTION: ICD-10-CM

## 2023-08-16 DIAGNOSIS — R29.898 DECREASED STRENGTH OF LOWER EXTREMITY: ICD-10-CM

## 2023-08-16 DIAGNOSIS — Z98.890 S/P MCL REPAIR: ICD-10-CM

## 2023-08-16 DIAGNOSIS — Z98.890 S/P MEDIAL MENISCUS REPAIR OF RIGHT KNEE: ICD-10-CM

## 2023-08-16 DIAGNOSIS — Z98.890 S/P LATERAL MENISCUS REPAIR OF RIGHT KNEE: ICD-10-CM

## 2023-08-16 DIAGNOSIS — R26.89 IMPAIRED GAIT AND MOBILITY: ICD-10-CM

## 2023-08-16 DIAGNOSIS — S89.91XD PCL INJURY, RIGHT, SUBSEQUENT ENCOUNTER: Primary | ICD-10-CM

## 2023-08-21 ENCOUNTER — TREATMENT (OUTPATIENT)
Dept: PHYSICAL THERAPY | Facility: CLINIC | Age: 28
End: 2023-08-21
Payer: COMMERCIAL

## 2023-08-21 DIAGNOSIS — R26.89 IMPAIRED GAIT AND MOBILITY: ICD-10-CM

## 2023-08-21 DIAGNOSIS — R29.898 DECREASED STRENGTH OF LOWER EXTREMITY: ICD-10-CM

## 2023-08-21 DIAGNOSIS — M25.661 DECREASED RANGE OF MOTION (ROM) OF RIGHT KNEE: ICD-10-CM

## 2023-08-21 DIAGNOSIS — Z98.890 S/P MCL REPAIR: ICD-10-CM

## 2023-08-21 DIAGNOSIS — Z98.890 S/P ACL RECONSTRUCTION: ICD-10-CM

## 2023-08-21 DIAGNOSIS — S89.91XD PCL INJURY, RIGHT, SUBSEQUENT ENCOUNTER: Primary | ICD-10-CM

## 2023-08-21 DIAGNOSIS — Z98.890 S/P LATERAL MENISCUS REPAIR OF RIGHT KNEE: ICD-10-CM

## 2023-08-21 DIAGNOSIS — Z98.890 S/P MEDIAL MENISCUS REPAIR OF RIGHT KNEE: ICD-10-CM

## 2023-08-21 NOTE — PROGRESS NOTES
Physical Therapy Progress Note    [facility]  [address]  [address]  [place] [address]      Patient: Fab Reaves   : 1995  Referring practitioner: Angel España MD  Date of Initial Visit: Type: THERAPY  Noted: 2023  Today's Date: 2023  Patient seen for 18 sessions         Fab Reaves reports: no new complaints.        Subjective     Objective   See Exercise, Manual, and Modality Logs for complete treatment.       Assessment/Plan  Fab continues to guard heavily, and ROM progress is slow. PT still waiting to hear back from ortho office re: order for dynamic splint for restoration of ROM. He needs considerable stretching and mobilizations from PT to tolerate R knee flexion above 90 degrees, and at best can reach 92 degrees with overpressure from therapist and significant pain at end range.  Visit Diagnoses:    ICD-10-CM ICD-9-CM   1. PCL injury, right, subsequent encounter  S89.91XD V58.89     959.7   2. S/P MCL repair  Z98.890 V45.89   3. Impaired gait and mobility  R26.89 781.2   4. Decreased strength of lower extremity  R29.898 729.89   5. S/P ACL reconstruction  Z98.890 V45.89   6. S/P lateral meniscus repair of right knee  Z98.890 V45.89   7. S/P medial meniscus repair of right knee  Z98.890 V45.89   8. Decreased range of motion (ROM) of right knee  M25.661 719.56       Progress per Plan of Care           Timed:  Manual Therapy:         mins  57773;  Therapeutic Exercise:    15     mins  43609;     Neuromuscular Pollo:        mins  80008;    Therapeutic Activity:     15     mins  01577;     Gait Training:       15    mins  52819;     Ultrasound:          mins  88963;    Electrical Stimulation:         mins  46262 ( );    Untimed:  Electrical Stimulation:         mins  80858 ( );  Mechanical Traction:         mins  29406;     Timed Treatment:   45   mins   Total Treatment:     45   mins  Gerald Lyn PT, DPT, OCS  Physical Therapist    [place] License #[id number]

## 2023-08-23 ENCOUNTER — TREATMENT (OUTPATIENT)
Dept: PHYSICAL THERAPY | Facility: CLINIC | Age: 28
End: 2023-08-23
Payer: COMMERCIAL

## 2023-08-23 DIAGNOSIS — M25.661 DECREASED RANGE OF MOTION (ROM) OF RIGHT KNEE: ICD-10-CM

## 2023-08-23 DIAGNOSIS — Z98.890 S/P ACL RECONSTRUCTION: ICD-10-CM

## 2023-08-23 DIAGNOSIS — S89.91XD PCL INJURY, RIGHT, SUBSEQUENT ENCOUNTER: Primary | ICD-10-CM

## 2023-08-23 DIAGNOSIS — R26.89 IMPAIRED GAIT AND MOBILITY: ICD-10-CM

## 2023-08-23 DIAGNOSIS — Z98.890 S/P LATERAL MENISCUS REPAIR OF RIGHT KNEE: ICD-10-CM

## 2023-08-23 DIAGNOSIS — R29.898 DECREASED STRENGTH OF LOWER EXTREMITY: ICD-10-CM

## 2023-08-23 DIAGNOSIS — Z98.890 S/P MCL REPAIR: ICD-10-CM

## 2023-08-23 DIAGNOSIS — Z98.890 S/P MEDIAL MENISCUS REPAIR OF RIGHT KNEE: ICD-10-CM

## 2023-08-23 NOTE — PROGRESS NOTES
Physical Therapy Progress Note    [facility]  [address]  [address]  [place] [address]      Patient: Fab Reaves   : 1995  Referring practitioner: Angel España MD  Date of Initial Visit: Type: THERAPY  Noted: 2023  Today's Date: 2023  Patient seen for 19 sessions         Fab Reaves reports: he's going to try some driving today.        Subjective     Objective   See Exercise, Manual, and Modality Logs for complete treatment.       Assessment/Plan  Fab is exhibiting good quad strength and balance, he exhibits stiffness in gait due to his knee flexion limitations. He could tolerate addition of lunges today to simulate work tasks and had no pain only stiffness. He continues to be limited to 90 degrees of R knee flexion due to stiffness.  Visit Diagnoses:    ICD-10-CM ICD-9-CM   1. PCL injury, right, subsequent encounter  S89.91XD V58.89     959.7   2. S/P MCL repair  Z98.890 V45.89   3. Impaired gait and mobility  R26.89 781.2   4. Decreased strength of lower extremity  R29.898 729.89   5. S/P ACL reconstruction  Z98.890 V45.89   6. S/P lateral meniscus repair of right knee  Z98.890 V45.89   7. S/P medial meniscus repair of right knee  Z98.890 V45.89   8. Decreased range of motion (ROM) of right knee  M25.661 719.56       Progress per Plan of Care           Timed:  Manual Therapy:         mins  41305;  Therapeutic Exercise:    15     mins  83142;     Neuromuscular Pollo:    8    mins  99431;    Therapeutic Activity:     15     mins  98262;     Gait Training:           mins  35754;     Ultrasound:          mins  39380;    Electrical Stimulation:        mins  31257 ( );    Untimed:  Electrical Stimulation:         mins  62681 ( );  Mechanical Traction:         mins  35273;     Timed Treatment:   38   mins   Total Treatment:     38   mins  Gerald Lyn PT, DPT, OCS  Physical Therapist   [place] License #[id number]

## 2023-08-28 ENCOUNTER — TREATMENT (OUTPATIENT)
Dept: PHYSICAL THERAPY | Facility: CLINIC | Age: 28
End: 2023-08-28
Payer: COMMERCIAL

## 2023-08-28 DIAGNOSIS — Z98.890 S/P MCL REPAIR: ICD-10-CM

## 2023-08-28 DIAGNOSIS — S89.91XD PCL INJURY, RIGHT, SUBSEQUENT ENCOUNTER: ICD-10-CM

## 2023-08-28 DIAGNOSIS — M25.661 DECREASED RANGE OF MOTION (ROM) OF RIGHT KNEE: Primary | ICD-10-CM

## 2023-08-28 DIAGNOSIS — R29.898 DECREASED STRENGTH OF LOWER EXTREMITY: ICD-10-CM

## 2023-08-28 DIAGNOSIS — Z98.890 S/P MEDIAL MENISCUS REPAIR OF RIGHT KNEE: ICD-10-CM

## 2023-08-28 DIAGNOSIS — Z98.890 S/P LATERAL MENISCUS REPAIR OF RIGHT KNEE: ICD-10-CM

## 2023-08-28 DIAGNOSIS — Z98.890 S/P ACL RECONSTRUCTION: ICD-10-CM

## 2023-08-28 DIAGNOSIS — R26.89 IMPAIRED GAIT AND MOBILITY: ICD-10-CM

## 2023-08-28 NOTE — PROGRESS NOTES
Physical Therapy Progress Note    [facility]  [address]  [address]  [place] [address]      Patient: Fab Reaves   : 1995  Referring practitioner: Angel España MD  Date of Initial Visit: Type: THERAPY  Noted: 2023  Today's Date: 2023  Patient seen for 20 sessions         Fab Reaves reports: he still hasn't gotten his dynasplint yet, but he got a call from the surgeon's office saying the order was placed. He reports he tried driving this past week.        Subjective     Objective   See Exercise, Manual, and Modality Logs for complete treatment.       Assessment/Plan  Fab continues to guard heavily with PROM stretching, AAROM of his R knee reached 95 degrees today. He is exhibiting good balance and strength with stair negotiation, but his descent is stiff.  Visit Diagnoses:    ICD-10-CM ICD-9-CM   1. Decreased range of motion (ROM) of right knee  M25.661 719.56   2. PCL injury, right, subsequent encounter  S89.91XD V58.89     959.7   3. S/P MCL repair  Z98.890 V45.89   4. Impaired gait and mobility  R26.89 781.2   5. Decreased strength of lower extremity  R29.898 729.89   6. S/P lateral meniscus repair of right knee  Z98.890 V45.89   7. S/P ACL reconstruction  Z98.890 V45.89   8. S/P medial meniscus repair of right knee  Z98.890 V45.89       Progress per Plan of Care           Timed:  Manual Therapy:         mins  32169;  Therapeutic Exercise:    15     mins  14603;     Neuromuscular Pollo:    15    mins  98324;    Therapeutic Activity:     15     mins  85519;     Gait Training:           mins  02750;     Ultrasound:          mins  05005;    Electrical Stimulation:         mins  06137 ( );    Untimed:  Electrical Stimulation:         mins  47359 ( );  Mechanical Traction:         mins  38644;     Timed Treatment:   45   mins   Total Treatment:     45   mins  Gerald Lyn PT, DPT, OCS  Physical Therapist   [place] License #[id number]

## 2023-08-30 ENCOUNTER — TREATMENT (OUTPATIENT)
Dept: PHYSICAL THERAPY | Facility: CLINIC | Age: 28
End: 2023-08-30
Payer: COMMERCIAL

## 2023-08-30 DIAGNOSIS — R26.89 IMPAIRED GAIT AND MOBILITY: ICD-10-CM

## 2023-08-30 DIAGNOSIS — R29.898 DECREASED STRENGTH OF LOWER EXTREMITY: ICD-10-CM

## 2023-08-30 DIAGNOSIS — Z98.890 S/P LATERAL MENISCUS REPAIR OF RIGHT KNEE: ICD-10-CM

## 2023-08-30 DIAGNOSIS — S89.91XD PCL INJURY, RIGHT, SUBSEQUENT ENCOUNTER: ICD-10-CM

## 2023-08-30 DIAGNOSIS — Z98.890 S/P MEDIAL MENISCUS REPAIR OF RIGHT KNEE: Primary | ICD-10-CM

## 2023-08-30 DIAGNOSIS — M25.661 DECREASED RANGE OF MOTION (ROM) OF RIGHT KNEE: ICD-10-CM

## 2023-08-30 DIAGNOSIS — Z98.890 S/P ACL RECONSTRUCTION: ICD-10-CM

## 2023-08-30 DIAGNOSIS — Z98.890 S/P MCL REPAIR: ICD-10-CM

## 2023-08-30 NOTE — PROGRESS NOTES
Physical Therapy Progress Note    [facility]  [address]  [address]  [place] [address]      Patient: Fab Reaves   : 1995  Referring practitioner: Angel España MD  Date of Initial Visit: Type: THERAPY  Noted: 2023  Today's Date: 2023  Patient seen for 21 sessions         Fab Reaves reports: he feels like he's ready to go back to work, but his job told him he can't come back until he doesn't have any restrictions. He is getting his dynasplint for home use today.        Subjective     Objective   See Exercise, Manual, and Modality Logs for complete treatment.       Assessment/Plan  Fab could only tolerate PROM R knee stretching to 88-92 degrees of flexion today before he compensates with hip extension or slides up the table away from the stretch. He was fitted for and dispensed a dynasplint today for improved compliance with knee flexion stretching. His strength is good, he is descending stairs with no weakness, just stiffness.  Visit Diagnoses:    ICD-10-CM ICD-9-CM   1. S/P medial meniscus repair of right knee  Z98.890 V45.89   2. Decreased range of motion (ROM) of right knee  M25.661 719.56   3. PCL injury, right, subsequent encounter  S89.91XD V58.89     959.7   4. S/P MCL repair  Z98.890 V45.89   5. Impaired gait and mobility  R26.89 781.2   6. Decreased strength of lower extremity  R29.898 729.89   7. S/P lateral meniscus repair of right knee  Z98.890 V45.89   8. S/P ACL reconstruction  Z98.890 V45.89       Progress per Plan of Care           Timed:  Manual Therapy:         mins  46323;  Therapeutic Exercise:    15     mins  45447;     Neuromuscular Pollo:    8    mins  01662;    Therapeutic Activity:     15     mins  75081;     Gait Training:           mins  62826;     Ultrasound:          mins  63508;    Electrical Stimulation:         mins  30347 ( );    Untimed:  Electrical Stimulation:         mins  37543 ( );  Mechanical  Traction:         mins  41739;     Timed Treatment:   38   mins   Total Treatment:     38   mins  Gerald Lyn PT, DPT, OCS  Physical Therapist   [place] License #[id number]

## 2023-08-31 NOTE — PROGRESS NOTES
Re-Assessment / Re-Certification      Patient: Fab Reaves   : 1995  Diagnosis/ICD-10 Code:  PCL injury, right, subsequent encounter [S89.91XD]  Referring practitioner: Angel España MD  Date of Initial Visit: Type: THERAPY  Noted: 2023  Today's Date: 2023  Patient seen for 17 sessions      Subjective:   Fab Reaves reports: he feels like he's getting stronger everyday, but his knee still just feels really stiff. He reports being compliant with HEP for knee flexion ROM.  Subjective Questionnaire: LEFS: give at next visit  Clinical Progress: improved  Home Program Compliance: Yes  Treatment has included: therapeutic exercise, neuromuscular re-education, manual therapy, therapeutic activity, gait training, and electrical stimulation    Subjective   Objective          Active Range of Motion   Left Knee   Flexion: 140 degrees   Extension: 0 degrees     Right Knee   Flexion: 88 degrees   Extension: 0 degrees     Strength/Myotome Testing     Left Knee   Flexion: 5  Extension: 5    Right Knee   Flexion: 4  Extension: 4+    Ambulation     Observational Gait   Gait: antalgic     Quality of Movement During Gait     Knee    Knee (Right): Positive increased flexion during swing.     Functional Assessment     Single Leg Stance   Left: 30 seconds  Right: 30 seconds    Assessment & Plan       Assessment  Assessment details: Fab's progress is limited by several factors: he lives a great distance from the PT clinic which limits his ability to attend multiple times per week, he is exhibiting some kinesophobia and fear avoidant behaviors with knee flexion ROM and stretching and he cannot return to his job until he is 100% improved with no restrictions from his doctor, which puts him in a delicate financial situation since he is the sole source of income in his household. His strength is progressing well and he can balance on his R leg for at least 30 seconds. He reports no pain, just stiffness and he feels like  his knee is going to rip open every time we stretch it in clinic. We have struggled to achieve 90 degrees of knee flexion and he is currently using a dynasplint for low load, long duration stretch at home. He continues to require skilled therapy to address the aforementioned limitations and to meet any unmet goals.      Visit Diagnoses:    ICD-10-CM ICD-9-CM   1. PCL injury, right, subsequent encounter  S89.91XD V58.89     959.7   2. S/P MCL repair  Z98.890 V45.89   3. Decreased strength of lower extremity  R29.898 729.89   4. S/P ACL reconstruction  Z98.890 V45.89   5. S/P lateral meniscus repair of right knee  Z98.890 V45.89   6. S/P medial meniscus repair of right knee  Z98.890 V45.89   7. Decreased range of motion (ROM) of right knee  M25.661 719.56   8. Impaired gait and mobility  R26.89 781.2       Progress toward previous goals: Partially Met      Short Term Goals (4 wks):  1.  Patient will have increased right knee flexion to 90 degrees. (Not met)  2.  Patient will have increased right quadriceps/VMO muscle activation to WFL. (Met)  3.  Patient will have decreased right knee swelling by 2 cm. (Met)  4.  Patient will be independent in HEP. (Met)     Short Term Goals (8 wks):  5.  Patient will have normalized gait pattern without AD. (Not met)  6.  Patient will have increased right knee flexion ROM to 130 degrees. (Not met)  7.  Patient will have increased right knee strength to 5/5. (Not met)  8.  Patient will have patellar mobility WNL. (met)      Long Term Goals (12 wks):  1.  Patient will be able to perform functional squat patterns with proper form. (Not met)  2.  Patient will be able to ascend/descend stairs with reciprocal pattern. (Not met)  3.  Patient will have improved LEFS score of 60/80 or better. (Not met)  4.  Patient will have SLS time of 30 seconds with steady posture. (met)  5.  Patient will be able to transition from standing to kneeling and return to standing.  (Not met)  Recommendations:  Continue as planned  Timeframe: 1 month  Prognosis to achieve goals: good    PT Signature: Gerald Lyn PT, DPT, OCS      Based upon review of the patient's progress and continued therapy plan, it is my medical opinion that Fab Reaves should continue physical therapy treatment at [facility]  [address] [place] [address] [id number] [phone].    Signature: __________________________________  Angel España MD    Timed:  Manual Therapy:         mins  52907;  Therapeutic Exercise:    15     mins  14006;     Neuromuscular Pollo:    8    mins  66295;    Therapeutic Activity:     15     mins  19926;     Gait Training:           mins  35036;     Ultrasound:          mins  32560;    Electrical Stimulation:         mins  23365 ( );    Untimed:  Electrical Stimulation:         mins  96960 ( );  Mechanical Traction:         mins  34278;     Timed Treatment:   38   mins   Total Treatment:     38   mins

## 2023-09-06 ENCOUNTER — TREATMENT (OUTPATIENT)
Dept: PHYSICAL THERAPY | Facility: CLINIC | Age: 28
End: 2023-09-06
Payer: COMMERCIAL

## 2023-09-06 DIAGNOSIS — Z98.890 S/P MEDIAL MENISCUS REPAIR OF RIGHT KNEE: Primary | ICD-10-CM

## 2023-09-06 DIAGNOSIS — R26.89 IMPAIRED GAIT AND MOBILITY: ICD-10-CM

## 2023-09-06 DIAGNOSIS — R29.898 DECREASED STRENGTH OF LOWER EXTREMITY: ICD-10-CM

## 2023-09-06 DIAGNOSIS — Z98.890 S/P ACL RECONSTRUCTION: ICD-10-CM

## 2023-09-06 DIAGNOSIS — S89.91XD PCL INJURY, RIGHT, SUBSEQUENT ENCOUNTER: ICD-10-CM

## 2023-09-06 DIAGNOSIS — M25.661 DECREASED RANGE OF MOTION (ROM) OF RIGHT KNEE: ICD-10-CM

## 2023-09-06 DIAGNOSIS — Z98.890 S/P LATERAL MENISCUS REPAIR OF RIGHT KNEE: ICD-10-CM

## 2023-09-06 DIAGNOSIS — Z98.890 S/P MCL REPAIR: ICD-10-CM

## 2023-09-06 NOTE — PROGRESS NOTES
Physical Therapy Daily Treatment Note  [facility] [place]   [address]  [place] [address]  P: [phone]       F: [phone]    Patient: Fab Reaves   : 1995  Diagnosis/ICD-10 Code:  S/P medial meniscus repair of right knee [Z98.890]  Referring practitioner: Angel España MD  Date of Initial Visit: Type: THERAPY  Noted: 2023  Today's Date: 2023  Patient seen for 22 sessions       Fab Reaves reports: have my doctor appointment today. Questions about manipulation.     Subjective     Objective   See Exercise, Manual, and Modality Logs for complete treatment.       Assessment/Plan  Subjectively, pt reports no increase of pain or discomfort with interventions performed today. Performed well with continued knee mobility interventions although R knee flexion only measures at 90 degrees. Continues to demonstrate good strength with functional activities. Continues to benefit from verbal/tactile cues to ensure proper form and technique for exercise performance.     Progress per Plan of Care           Manual Therapy:         mins  04463;  Therapeutic Exercise:    15     mins  39917;     Neuromuscular Pollo:    10    mins  98850;    Therapeutic Activity:     10     mins  71321;     Gait Training:           mins  10498;     Ultrasound:          mins  84605;    Electrical Stimulation:         mins  29929;  Traction          mins 79041    Timed Treatment:   35   mins   Total Treatment:     35   mins    Eva Jenkins PTA  Physical Therapist Assistant [id number]

## 2023-09-08 ENCOUNTER — TREATMENT (OUTPATIENT)
Dept: PHYSICAL THERAPY | Facility: CLINIC | Age: 28
End: 2023-09-08
Payer: COMMERCIAL

## 2023-09-08 DIAGNOSIS — Z98.890 S/P LATERAL MENISCUS REPAIR OF RIGHT KNEE: ICD-10-CM

## 2023-09-08 DIAGNOSIS — M25.661 DECREASED RANGE OF MOTION (ROM) OF RIGHT KNEE: ICD-10-CM

## 2023-09-08 DIAGNOSIS — S89.91XD PCL INJURY, RIGHT, SUBSEQUENT ENCOUNTER: ICD-10-CM

## 2023-09-08 DIAGNOSIS — Z98.890 S/P MCL REPAIR: ICD-10-CM

## 2023-09-08 DIAGNOSIS — Z98.890 S/P ACL RECONSTRUCTION: ICD-10-CM

## 2023-09-08 DIAGNOSIS — R29.898 DECREASED STRENGTH OF LOWER EXTREMITY: ICD-10-CM

## 2023-09-08 DIAGNOSIS — R26.89 IMPAIRED GAIT AND MOBILITY: ICD-10-CM

## 2023-09-08 DIAGNOSIS — Z98.890 S/P MEDIAL MENISCUS REPAIR OF RIGHT KNEE: Primary | ICD-10-CM

## 2023-09-08 NOTE — PROGRESS NOTES
Physical Therapy Progress Note    [facility]  [address]  [address]  [place] [address]      Patient: Fab Reaves   : 1995  Referring practitioner: Angel España MD  Date of Initial Visit: Type: THERAPY  Noted: 2023  Today's Date: 2023  Patient seen for 23 sessions         Fab Reaves reports: he saw his surgeon who gave him a dosepack for inflammation and swelling and wants him to return in 3 weeks. Manipulation is on the table for his stiffness, but MD wants to try to give it another three weeks. He needs to leave a little early today to go to his work and drop off MD paperwork.        Subjective     Objective   See Exercise, Manual, and Modality Logs for complete treatment.       Assessment/Plan  Fab could achieve 94 degrees of knee flexion today but has pain at end range, guards heavily and scoots away from therapist on table or in chair. PT discussed with patient and will add more frequent visits for ROM. PT also suggested working on ROM every hour at home as able while he's taking his dosepack.  Visit Diagnoses:    ICD-10-CM ICD-9-CM   1. S/P medial meniscus repair of right knee  Z98.890 V45.89   2. S/P ACL reconstruction  Z98.890 V45.89   3. Decreased range of motion (ROM) of right knee  M25.661 719.56   4. PCL injury, right, subsequent encounter  S89.91XD V58.89     959.7   5. S/P MCL repair  Z98.890 V45.89   6. Impaired gait and mobility  R26.89 781.2   7. Decreased strength of lower extremity  R29.898 729.89   8. S/P lateral meniscus repair of right knee  Z98.890 V45.89       Progress per Plan of Care and Progress strengthening /stabilization /functional activity           Timed:  Manual Therapy:    10     mins  03570;  Therapeutic Exercise:    15     mins  29608;     Neuromuscular Pollo:        mins  45538;    Therapeutic Activity:     10     mins  60683;     Gait Training:           mins  65742;     Ultrasound:          mins  95597;     Electrical Stimulation:         mins  67070 ( );    Untimed:  Electrical Stimulation:         mins  94226 ( );  Mechanical Traction:         mins  71465;     Timed Treatment:   35   mins   Total Treatment:     35   mins  Gerald Lyn PT, DPT, [facility]  Physical Therapist   [place] License #[id number]

## 2023-09-11 ENCOUNTER — TREATMENT (OUTPATIENT)
Dept: PHYSICAL THERAPY | Facility: CLINIC | Age: 28
End: 2023-09-11
Payer: COMMERCIAL

## 2023-09-11 DIAGNOSIS — S89.91XD PCL INJURY, RIGHT, SUBSEQUENT ENCOUNTER: ICD-10-CM

## 2023-09-11 DIAGNOSIS — Z98.890 S/P LATERAL MENISCUS REPAIR OF RIGHT KNEE: ICD-10-CM

## 2023-09-11 DIAGNOSIS — R29.898 DECREASED STRENGTH OF LOWER EXTREMITY: ICD-10-CM

## 2023-09-11 DIAGNOSIS — R26.89 IMPAIRED GAIT AND MOBILITY: ICD-10-CM

## 2023-09-11 DIAGNOSIS — M25.661 DECREASED RANGE OF MOTION (ROM) OF RIGHT KNEE: ICD-10-CM

## 2023-09-11 DIAGNOSIS — Z98.890 S/P MCL REPAIR: ICD-10-CM

## 2023-09-11 DIAGNOSIS — Z98.890 S/P MEDIAL MENISCUS REPAIR OF RIGHT KNEE: Primary | ICD-10-CM

## 2023-09-11 DIAGNOSIS — Z98.890 S/P ACL RECONSTRUCTION: ICD-10-CM

## 2023-09-11 NOTE — PROGRESS NOTES
Physical Therapy Progress Note    [facility] Physical Therapy  [address]  [address]  [place] [address]      Patient: Fab Reaves   : 1995  Referring practitioner: Angel España MD  Date of Initial Visit: Type: THERAPY  Noted: 2023  Today's Date: 2023  Patient seen for 24 sessions         Fab Reaves reports: he still hasn't gotten his steroid pack yet.        Subjective     Objective   See Exercise, Manual, and Modality Logs for complete treatment.       Assessment/Plan  Fab could flex his R knee to 95 degrees today, but he exhibits considerable swelling and joint effusion at the R knee today, which limits his R knee ROM.  Visit Diagnoses:    ICD-10-CM ICD-9-CM   1. S/P medial meniscus repair of right knee  Z98.890 V45.89   2. S/P lateral meniscus repair of right knee  Z98.890 V45.89   3. S/P ACL reconstruction  Z98.890 V45.89   4. Decreased range of motion (ROM) of right knee  M25.661 719.56   5. PCL injury, right, subsequent encounter  S89.91XD V58.89     959.7   6. S/P MCL repair  Z98.890 V45.89   7. Impaired gait and mobility  R26.89 781.2   8. Decreased strength of lower extremity  R29.898 729.89       Progress per Plan of Care           Timed:  Manual Therapy:         mins  46565;  Therapeutic Exercise:    15     mins  58574;     Neuromuscular Pollo:    8    mins  24096;    Therapeutic Activity:     15     mins  91308;     Gait Training:           mins  87603;     Ultrasound:          mins  24044;    Electrical Stimulation:         mins  45023 ( );    Untimed:  Electrical Stimulation:         mins  05750 ( );  Mechanical Traction:         mins  45155;     Timed Treatment:   38   mins   Total Treatment:     38   mins  Gerald Lyn PT, DPT, OCS  Physical Therapist   [place] License #[id number]

## 2023-09-13 ENCOUNTER — TREATMENT (OUTPATIENT)
Dept: PHYSICAL THERAPY | Facility: CLINIC | Age: 28
End: 2023-09-13
Payer: COMMERCIAL

## 2023-09-13 DIAGNOSIS — Z98.890 S/P LATERAL MENISCUS REPAIR OF RIGHT KNEE: ICD-10-CM

## 2023-09-13 DIAGNOSIS — Z98.890 S/P ACL RECONSTRUCTION: ICD-10-CM

## 2023-09-13 DIAGNOSIS — R26.89 IMPAIRED GAIT AND MOBILITY: ICD-10-CM

## 2023-09-13 DIAGNOSIS — Z98.890 S/P MCL REPAIR: ICD-10-CM

## 2023-09-13 DIAGNOSIS — R29.898 DECREASED STRENGTH OF LOWER EXTREMITY: Primary | ICD-10-CM

## 2023-09-13 DIAGNOSIS — S89.91XD PCL INJURY, RIGHT, SUBSEQUENT ENCOUNTER: ICD-10-CM

## 2023-09-13 DIAGNOSIS — M25.661 DECREASED RANGE OF MOTION (ROM) OF RIGHT KNEE: ICD-10-CM

## 2023-09-13 DIAGNOSIS — Z98.890 S/P MEDIAL MENISCUS REPAIR OF RIGHT KNEE: ICD-10-CM

## 2023-09-13 NOTE — PROGRESS NOTES
Physical Therapy Progress Note    [facility] Physical Therapy  [address]  [address]  [place] [address]      Patient: Fab Reaves   : 1995  Referring practitioner: Angel España MD  Date of Initial Visit: Type: THERAPY  Noted: 2023  Today's Date: 2023  Patient seen for 25 sessions         Fab Reaves reports: no new complaints, he's back to work with some restrictions.        Subjective     Objective   See Exercise, Manual, and Modality Logs for complete treatment.       Assessment/Plan  Fab could flex his R knee to 96 degrees today with pain and guarding at end range. He continues to exhibit good strength with lunges and stair negotiation, but his R knee flexion stiffness limits good form.   Visit Diagnoses:    ICD-10-CM ICD-9-CM   1. Decreased strength of lower extremity  R29.898 729.89   2. S/P medial meniscus repair of right knee  Z98.890 V45.89   3. S/P lateral meniscus repair of right knee  Z98.890 V45.89   4. S/P ACL reconstruction  Z98.890 V45.89   5. Decreased range of motion (ROM) of right knee  M25.661 719.56   6. PCL injury, right, subsequent encounter  S89.91XD V58.89     959.7   7. S/P MCL repair  Z98.890 V45.89   8. Impaired gait and mobility  R26.89 781.2       Progress per Plan of Care           Timed:  Manual Therapy:         mins  81858;  Therapeutic Exercise:    15     mins  76809;     Neuromuscular Pollo:    8    mins  50395;    Therapeutic Activity:     15     mins  66954;     Gait Training:           mins  72670;     Ultrasound:          mins  83650;    Electrical Stimulation:         mins  81553 ( );    Untimed:  Electrical Stimulation:         mins  59347 ( );  Mechanical Traction:         mins  13467;     Timed Treatment:   38   mins   Total Treatment:     38   mins  Gerald Lyn PT, DPT, [facility]  Physical Therapist   [place] License #[id number]

## 2023-09-15 ENCOUNTER — TREATMENT (OUTPATIENT)
Dept: PHYSICAL THERAPY | Facility: CLINIC | Age: 28
End: 2023-09-15
Payer: COMMERCIAL

## 2023-09-15 DIAGNOSIS — S89.91XD PCL INJURY, RIGHT, SUBSEQUENT ENCOUNTER: ICD-10-CM

## 2023-09-15 DIAGNOSIS — Z98.890 S/P MCL REPAIR: ICD-10-CM

## 2023-09-15 DIAGNOSIS — M25.661 DECREASED RANGE OF MOTION (ROM) OF RIGHT KNEE: ICD-10-CM

## 2023-09-15 DIAGNOSIS — R26.89 IMPAIRED GAIT AND MOBILITY: ICD-10-CM

## 2023-09-15 DIAGNOSIS — Z98.890 S/P ACL RECONSTRUCTION: ICD-10-CM

## 2023-09-15 DIAGNOSIS — Z98.890 S/P LATERAL MENISCUS REPAIR OF RIGHT KNEE: ICD-10-CM

## 2023-09-15 DIAGNOSIS — Z98.890 S/P MEDIAL MENISCUS REPAIR OF RIGHT KNEE: ICD-10-CM

## 2023-09-15 DIAGNOSIS — R29.898 DECREASED STRENGTH OF LOWER EXTREMITY: Primary | ICD-10-CM

## 2023-09-15 NOTE — PROGRESS NOTES
Physical Therapy Progress Note    [facility] Physical Therapy  [address]  [address]  [place] [address]      Patient: Fab Reaves   : 1995  Referring practitioner: Angel España MD  Date of Initial Visit: Type: THERAPY  Noted: 2023  Today's Date: 9/15/2023  Patient seen for 26 sessions         aFb Reaves reports: no new complaints, he's going to be doing a lot of walking at a fall festival this weekend. He's not on the steroid pack yet, still waiting to get it from the pharmacy. He reports continued compliance with dynasplint.        Subjective     Objective   See Exercise, Manual, and Modality Logs for complete treatment.       Assessment/Plan  Fab continues to be limited to 90-95 degrees of R knee flexion and he exhibits increased knee joint edema. PT stressed importance of steroid pack and anti-inflammatory for control of his swelling, which is limiting his knee ROM. Strength continues to be good, he is tolerating lunges to the floor with good strength and balance and exhibits no weakness with stair negotiation.  Visit Diagnoses:    ICD-10-CM ICD-9-CM   1. Decreased strength of lower extremity  R29.898 729.89   2. S/P medial meniscus repair of right knee  Z98.890 V45.89   3. Impaired gait and mobility  R26.89 781.2   4. S/P lateral meniscus repair of right knee  Z98.890 V45.89   5. S/P ACL reconstruction  Z98.890 V45.89   6. PCL injury, right, subsequent encounter  S89.91XD V58.89     959.7   7. Decreased range of motion (ROM) of right knee  M25.661 719.56   8. S/P MCL repair  Z98.890 V45.89       Progress per Plan of Care           Timed:  Manual Therapy:         mins  38441;  Therapeutic Exercise:    10     mins  36350;     Neuromuscular Pollo:        mins  72099;    Therapeutic Activity:     10     mins  78765;     Gait Training:      10     mins  59322;     Ultrasound:          mins  12235;    Electrical Stimulation:         mins  44131 (  );    Untimed:  Electrical Stimulation:         mins  41520 ( );  Mechanical Traction:         mins  13257;     Timed Treatment:   30   mins   Total Treatment:     30   mins  Gerald Lyn PT, DPT, [facility]  Physical Therapist   [place] License #[id number]

## 2023-09-18 ENCOUNTER — TREATMENT (OUTPATIENT)
Dept: PHYSICAL THERAPY | Facility: CLINIC | Age: 28
End: 2023-09-18
Payer: COMMERCIAL

## 2023-09-18 DIAGNOSIS — M25.661 DECREASED RANGE OF MOTION (ROM) OF RIGHT KNEE: ICD-10-CM

## 2023-09-18 DIAGNOSIS — Z98.890 S/P MCL REPAIR: Primary | ICD-10-CM

## 2023-09-18 DIAGNOSIS — Z98.890 S/P MEDIAL MENISCUS REPAIR OF RIGHT KNEE: ICD-10-CM

## 2023-09-18 DIAGNOSIS — R29.898 DECREASED STRENGTH OF LOWER EXTREMITY: ICD-10-CM

## 2023-09-18 DIAGNOSIS — Z98.890 S/P ACL RECONSTRUCTION: ICD-10-CM

## 2023-09-18 DIAGNOSIS — Z98.890 S/P LATERAL MENISCUS REPAIR OF RIGHT KNEE: ICD-10-CM

## 2023-09-18 DIAGNOSIS — R26.89 IMPAIRED GAIT AND MOBILITY: ICD-10-CM

## 2023-09-18 NOTE — PROGRESS NOTES
Physical Therapy Progress Note    [facility] Physical Therapy  [address]  [address]  [place] [address]      Patient: Fab Reaves   : 1995  Referring practitioner: Angel España MD  Date of Initial Visit: Type: THERAPY  Noted: 2023  Today's Date: 2023  Patient seen for 27 sessions         Fab Reaves reports: he is on day one of his steroid pack, he is also planning on stopping to get a compression sleeve today.        Subjective     Objective   See Exercise, Manual, and Modality Logs for complete treatment.       Assessment/Plan  Fab continues to be limited to 94 degrees of AAROM R knee flexion. He exhibits increased R knee edema which is contributing to his ROM deficits.  Visit Diagnoses:    ICD-10-CM ICD-9-CM   1. S/P MCL repair  Z98.890 V45.89   2. Decreased strength of lower extremity  R29.898 729.89   3. S/P medial meniscus repair of right knee  Z98.890 V45.89   4. Impaired gait and mobility  R26.89 781.2   5. S/P lateral meniscus repair of right knee  Z98.890 V45.89   6. S/P ACL reconstruction  Z98.890 V45.89   7. Decreased range of motion (ROM) of right knee  M25.661 719.56       Progress per Plan of Care           Timed:  Manual Therapy:         mins  67329;  Therapeutic Exercise:    15     mins  22947;     Neuromuscular Pollo:        mins  99783;    Therapeutic Activity:     15     mins  78281;     Gait Training:           mins  10246;     Ultrasound:          mins  15434;    Electrical Stimulation:         mins  91522 ( );    Untimed:  Electrical Stimulation:         mins  18523 ( );  Mechanical Traction:         mins  69880;     Timed Treatment:   30   mins   Total Treatment:     30   mins  Gerald Lyn PT, DPT, OCS  Physical Therapist   [place] License #[id number]

## 2023-09-20 ENCOUNTER — TREATMENT (OUTPATIENT)
Dept: PHYSICAL THERAPY | Facility: CLINIC | Age: 28
End: 2023-09-20
Payer: COMMERCIAL

## 2023-09-20 DIAGNOSIS — S89.91XD PCL INJURY, RIGHT, SUBSEQUENT ENCOUNTER: ICD-10-CM

## 2023-09-20 DIAGNOSIS — M25.661 DECREASED RANGE OF MOTION (ROM) OF RIGHT KNEE: ICD-10-CM

## 2023-09-20 DIAGNOSIS — R29.898 DECREASED STRENGTH OF LOWER EXTREMITY: ICD-10-CM

## 2023-09-20 DIAGNOSIS — Z98.890 S/P LATERAL MENISCUS REPAIR OF RIGHT KNEE: ICD-10-CM

## 2023-09-20 DIAGNOSIS — R26.89 IMPAIRED GAIT AND MOBILITY: ICD-10-CM

## 2023-09-20 DIAGNOSIS — Z98.890 S/P MCL REPAIR: Primary | ICD-10-CM

## 2023-09-20 DIAGNOSIS — Z98.890 S/P ACL RECONSTRUCTION: ICD-10-CM

## 2023-09-20 DIAGNOSIS — Z98.890 S/P MEDIAL MENISCUS REPAIR OF RIGHT KNEE: ICD-10-CM

## 2023-09-20 NOTE — PROGRESS NOTES
Physical Therapy Progress Note    [facility] Physical Therapy  [address]  [address]  [place] [address]      Patient: Fab Reaves   : 1995  Referring practitioner: Angel España MD  Date of Initial Visit: Type: THERAPY  Noted: 2023  Today's Date: 2023  Patient seen for 28 sessions         Fab Reaves reports: he sees his MD next week, no new complaints. He has no pain, just stiffness. He reports compliance with steroid, anti-inflammatory, compression sleeve and dynasplint to improve his ROM.        Subjective     Objective   See Exercise, Manual, and Modality Logs for complete treatment.       Assessment/Plan  Fab continues to be limited to 95 degrees of R knee flexion due to joint stiffness and swelling. It is seeming more likely he will need manipulation to regain his motion as PT has been unable to reduce his guarding.   Visit Diagnoses:    ICD-10-CM ICD-9-CM   1. S/P MCL repair  Z98.890 V45.89   2. Decreased strength of lower extremity  R29.898 729.89   3. S/P medial meniscus repair of right knee  Z98.890 V45.89   4. PCL injury, right, subsequent encounter  S89.91XD V58.89     959.7   5. Impaired gait and mobility  R26.89 781.2   6. S/P lateral meniscus repair of right knee  Z98.890 V45.89   7. Decreased range of motion (ROM) of right knee  M25.661 719.56   8. S/P ACL reconstruction  Z98.890 V45.89       Progress per Plan of Care           Timed:  Manual Therapy:         mins  46150;  Therapeutic Exercise:    15     mins  65401;     Neuromuscular Pollo:        mins  28564;    Therapeutic Activity:     23     mins  13106;     Gait Training:           mins  84146;     Ultrasound:         mins  34416;    Electrical Stimulation:         mins  10153 ( );    Untimed:  Electrical Stimulation:         mins  10907 ( );  Mechanical Traction:         mins  67785;     Timed Treatment:   38   mins   Total Treatment:     38   mins  Gerald Lyn PT, DPT,  OCS  Physical Therapist   [place] License #[id number]

## 2023-09-22 ENCOUNTER — TREATMENT (OUTPATIENT)
Dept: PHYSICAL THERAPY | Facility: CLINIC | Age: 28
End: 2023-09-22
Payer: COMMERCIAL

## 2023-09-22 DIAGNOSIS — Z98.890 S/P MCL REPAIR: Primary | ICD-10-CM

## 2023-09-22 DIAGNOSIS — Z98.890 S/P MEDIAL MENISCUS REPAIR OF RIGHT KNEE: ICD-10-CM

## 2023-09-22 DIAGNOSIS — R29.898 DECREASED STRENGTH OF LOWER EXTREMITY: ICD-10-CM

## 2023-09-22 DIAGNOSIS — R26.89 IMPAIRED GAIT AND MOBILITY: ICD-10-CM

## 2023-09-22 DIAGNOSIS — S89.91XD PCL INJURY, RIGHT, SUBSEQUENT ENCOUNTER: ICD-10-CM

## 2023-09-22 DIAGNOSIS — Z98.890 S/P LATERAL MENISCUS REPAIR OF RIGHT KNEE: ICD-10-CM

## 2023-09-22 DIAGNOSIS — Z98.890 S/P ACL RECONSTRUCTION: ICD-10-CM

## 2023-09-22 DIAGNOSIS — M25.661 DECREASED RANGE OF MOTION (ROM) OF RIGHT KNEE: ICD-10-CM

## 2023-09-22 NOTE — PROGRESS NOTES
Physical Therapy Daily Treatment Note  [facility] Physical Therapy [place]   [address]  [place] [address]  P: [phone]       F: [phone]    Patient: Fab Reaves   : 1995  Diagnosis/ICD-10 Code:  S/P MCL repair [Z98.890]  Referring practitioner: Angel España MD  Date of Initial Visit: Type: THERAPY  Noted: 2023  Today's Date: 2023  Patient seen for 29 sessions       Fab Reaves reports: been taking steroid since Monday, I think it has helped the swelling. Wearing ana splint 20-30 minutes 2x a day. Don't feel like that is helping much.     Subjective     Objective   See Exercise, Manual, and Modality Logs for complete treatment.       Assessment/Plan  Subjectively, pt reports no increase of pain or discomfort with interventions performed today. Performed well with continued R knee mobility interventions. Continues to demonstrate significant muscle guarding with manual techniques. Continues to benefit from verbal/tactile cues to ensure proper form and technique for exercise performance.     Progress per Plan of Care           Manual Therapy:    8     mins  07444;  Therapeutic Exercise:    15     mins  11536;     Neuromuscular Pollo:        mins  39292;    Therapeutic Activity:     15     mins  56326;     Gait Training:           mins  03300;     Ultrasound:          mins  52399;    Electrical Stimulation:         mins  93126;  Traction          mins 78260    Timed Treatment:   38   mins   Total Treatment:     38   mins    Eva Jenkins PTA  Physical Therapist Assistant [id number]

## 2023-09-25 ENCOUNTER — TREATMENT (OUTPATIENT)
Dept: PHYSICAL THERAPY | Facility: CLINIC | Age: 28
End: 2023-09-25
Payer: COMMERCIAL

## 2023-09-25 DIAGNOSIS — Z98.890 S/P MCL REPAIR: Primary | ICD-10-CM

## 2023-09-25 DIAGNOSIS — S89.91XD PCL INJURY, RIGHT, SUBSEQUENT ENCOUNTER: ICD-10-CM

## 2023-09-25 DIAGNOSIS — Z98.890 S/P LATERAL MENISCUS REPAIR OF RIGHT KNEE: ICD-10-CM

## 2023-09-25 DIAGNOSIS — Z98.890 S/P ACL RECONSTRUCTION: ICD-10-CM

## 2023-09-25 DIAGNOSIS — M25.661 DECREASED RANGE OF MOTION (ROM) OF RIGHT KNEE: ICD-10-CM

## 2023-09-25 DIAGNOSIS — R29.898 DECREASED STRENGTH OF LOWER EXTREMITY: ICD-10-CM

## 2023-09-25 DIAGNOSIS — Z98.890 S/P MEDIAL MENISCUS REPAIR OF RIGHT KNEE: ICD-10-CM

## 2023-09-25 DIAGNOSIS — R26.89 IMPAIRED GAIT AND MOBILITY: ICD-10-CM

## 2023-09-25 PROCEDURE — 97110 THERAPEUTIC EXERCISES: CPT | Performed by: PHYSICAL THERAPIST

## 2023-09-25 PROCEDURE — 97112 NEUROMUSCULAR REEDUCATION: CPT | Performed by: PHYSICAL THERAPIST

## 2023-09-25 PROCEDURE — 97530 THERAPEUTIC ACTIVITIES: CPT | Performed by: PHYSICAL THERAPIST

## 2023-09-25 NOTE — PROGRESS NOTES
e   Physical Therapy Progress Note    [facility] Physical Therapy  [address]  [address]  [place] [address]      Patient: Fab Reaves   : 1995  Referring practitioner: Angel España MD  Date of Initial Visit: Type: THERAPY  Noted: 2023  Today's Date: 2023  Patient seen for 30 sessions         Fab Reaves reports: he sees MD on Wednesday, finished with his steroid now, he's also wearing a compression sleeve.        Subjective     Objective   See Exercise, Manual, and Modality Logs for complete treatment.       Assessment/Plan  Fab continues to be limited to 90-95 degrees of AROM R knee flexion, even with steroid dosepack and compression sleeve. His knee is showing signs of arthrofibrosis.  Visit Diagnoses:    ICD-10-CM ICD-9-CM   1. S/P MCL repair  Z98.890 V45.89   2. S/P ACL reconstruction  Z98.890 V45.89   3. S/P medial meniscus repair of right knee  Z98.890 V45.89   4. Decreased strength of lower extremity  R29.898 729.89   5. PCL injury, right, subsequent encounter  S89.91XD V58.89     959.7   6. S/P lateral meniscus repair of right knee  Z98.890 V45.89   7. Decreased range of motion (ROM) of right knee  M25.661 719.56   8. Impaired gait and mobility  R26.89 781.2       Progress per Plan of Care           Timed:  Manual Therapy:         mins  33007;  Therapeutic Exercise:    15     mins  39818;     Neuromuscular Pollo:    8    mins  18767;    Therapeutic Activity:     15     mins  99875;     Gait Training:           mins  91564;     Ultrasound:          mins  99350;    Electrical Stimulation:         mins  75165 ( );    Untimed:  Electrical Stimulation:         mins  97199 ( );  Mechanical Traction:         mins  23281;     Timed Treatment:   38   mins   Total Treatment:     38   mins  Gerald Lyn PT, DPT, OCS  Physical Therapist   [place] License #[id number]

## 2023-10-11 ENCOUNTER — TREATMENT (OUTPATIENT)
Dept: PHYSICAL THERAPY | Facility: CLINIC | Age: 28
End: 2023-10-11
Payer: COMMERCIAL

## 2023-10-11 DIAGNOSIS — Z98.890 S/P LATERAL MENISCUS REPAIR OF RIGHT KNEE: ICD-10-CM

## 2023-10-11 DIAGNOSIS — R26.89 IMPAIRED GAIT AND MOBILITY: ICD-10-CM

## 2023-10-11 DIAGNOSIS — M25.661 DECREASED RANGE OF MOTION (ROM) OF RIGHT KNEE: ICD-10-CM

## 2023-10-11 DIAGNOSIS — Z98.890 S/P ACL RECONSTRUCTION: ICD-10-CM

## 2023-10-11 DIAGNOSIS — S89.91XD PCL INJURY, RIGHT, SUBSEQUENT ENCOUNTER: ICD-10-CM

## 2023-10-11 DIAGNOSIS — Z98.890 S/P MCL REPAIR: Primary | ICD-10-CM

## 2023-10-11 DIAGNOSIS — R29.898 DECREASED STRENGTH OF LOWER EXTREMITY: ICD-10-CM

## 2023-10-11 DIAGNOSIS — Z98.890 S/P MEDIAL MENISCUS REPAIR OF RIGHT KNEE: ICD-10-CM

## 2023-10-12 ENCOUNTER — TREATMENT (OUTPATIENT)
Dept: PHYSICAL THERAPY | Facility: CLINIC | Age: 28
End: 2023-10-12
Payer: COMMERCIAL

## 2023-10-12 DIAGNOSIS — Z98.890 S/P ACL RECONSTRUCTION: ICD-10-CM

## 2023-10-12 DIAGNOSIS — S89.91XD PCL INJURY, RIGHT, SUBSEQUENT ENCOUNTER: ICD-10-CM

## 2023-10-12 DIAGNOSIS — R29.898 DECREASED STRENGTH OF LOWER EXTREMITY: ICD-10-CM

## 2023-10-12 DIAGNOSIS — Z98.890 S/P MCL REPAIR: Primary | ICD-10-CM

## 2023-10-12 DIAGNOSIS — M25.661 DECREASED RANGE OF MOTION (ROM) OF RIGHT KNEE: ICD-10-CM

## 2023-10-12 DIAGNOSIS — Z98.890 S/P LATERAL MENISCUS REPAIR OF RIGHT KNEE: ICD-10-CM

## 2023-10-12 DIAGNOSIS — R26.89 IMPAIRED GAIT AND MOBILITY: ICD-10-CM

## 2023-10-12 DIAGNOSIS — Z98.890 S/P MEDIAL MENISCUS REPAIR OF RIGHT KNEE: ICD-10-CM

## 2023-10-12 NOTE — PROGRESS NOTES
Re-Evaluation      Patient: Fab Reaves   : 1995  Diagnosis/ICD-10 Code:  S/P MCL repair [Z98.890]  Referring practitioner: Angel España MD  Date of Initial Visit: Type: THERAPY  Noted: 2023  Today's Date: 10/13/2023  Patient seen for 31 sessions      Subjective:   Fab Reaves reports: he underwent R knee manipulation under anesthesia along with lateral release and lysis of adhesions yesterday after failing to regain full R knee flexion ROM. He has a history of ACL, PCL, MCL and medial and lateral meniscus reconstruction/repair on 23 with Dr España in [place]. He was placed in a post-op brace locked at 0 until nerve block wears off and he presents to clinic today with bilateral crutches for ambulation.  Subjective Questionnaire: LEFS:   Clinical Progress: unchanged  Home Program Compliance: Yes  Treatment has included: therapeutic exercise, neuromuscular re-education, manual therapy, therapeutic activity, gait training, electrical stimulation, and cryotherapy    Subjective Evaluation    Pain  Current pain ratin  At worst pain ratin       Objective          Observations     Right Knee   Positive for effusion and incision.     Additional Knee Observation Details  Bruising along portal sites, incision covered with 4x4 gauze and occlusive bandage.    Passive Range of Motion   Left Knee   Flexion: 140 degrees   Extension: 0 degrees     Right Knee   Flexion: 95 degrees   Extension: 0 degrees     Strength/Myotome Testing     Left Knee   Flexion: 5  Extension: 5    Right Knee   Flexion: 4-  Extension: 4-    Additional Strength Details  Strength limited today due to nerve block.    Ambulation   Weight-Bearing Status   Weight-Bearing Status (Left): weight-bearing as tolerated   Assistive device used: crutches      Assessment & Plan       Assessment  Assessment details: Fab exhibits severe R knee flexion ROM deficits as well as compensations in gait and difficulty with stairs and getting  up and down from the floor due to knee stiffness. He would benefit from additional skilled therapy to address the aforementioned functional limitation and to return to his job as an  with no pain or limitations.  Barriers to therapy: compliance with HEP, compliance with meds, fear-avoidance and kinesophobia      Visit Diagnoses:    ICD-10-CM ICD-9-CM   1. S/P MCL repair  Z98.890 V45.89   2. S/P ACL reconstruction  Z98.890 V45.89   3. Impaired gait and mobility  R26.89 781.2   4. Decreased strength of lower extremity  R29.898 729.89   5. S/P medial meniscus repair of right knee  Z98.890 V45.89   6. PCL injury, right, subsequent encounter  S89.91XD V58.89     959.7   7. S/P lateral meniscus repair of right knee  Z98.890 V45.89   8. Decreased range of motion (ROM) of right knee  M25.661 719.56       Progress toward previous goals: Partially Met   Short Term Goals (4 wks):  1.  Patient will have increased right knee flexion to 90 degrees. (met)  2.  Patient will have increased right quadriceps/VMO muscle activation to WFL. (Met)  3.  Patient will have decreased right knee swelling by 2 cm. (Met)  4.  Patient will be independent in HEP. (Met)     Short Term Goals (8 wks):  5.  Patient will have normalized gait pattern without AD. (Not met)  6.  Patient will have increased right knee flexion ROM to 130 degrees. (Not met)  7.  Patient will have increased right knee strength to 5/5. (Not met)  8.  Patient will have patellar mobility WNL. (met)      Long Term Goals (12 wks):  1.  Patient will be able to perform functional squat patterns with proper form. (Not met)  2.  Patient will be able to ascend/descend stairs with reciprocal pattern. (Not met)  3.  Patient will have improved LEFS score of 60/80 or better. (Not met)  4.  Patient will have SLS time of 30 seconds with steady posture. (met)  5.  Patient will be able to transition from standing to kneeling and return to standing.  (Not met)        Recommendations:  Continue as planned  Timeframe: 1 month  Prognosis to achieve goals: good    PT Signature: Gerald Lyn PT, DPT, OCS      Based upon review of the patient's progress and continued therapy plan, it is my medical opinion that Fab Reaves should continue physical therapy treatment at [facility]  [address] [place] [id number] [phone].    Signature: __________________________________  Angel España MD    Timed:  Manual Therapy:    25     mins  89216;  Therapeutic Exercise:    15     mins  50058;     Neuromuscular Pollo:        mins  78633;    Therapeutic Activity:     15     mins  97450;     Gait Training:           mins  82062;     Ultrasound:          mins  76171;    Electrical Stimulation:         mins  59960 ( );    Untimed:  Electrical Stimulation:         mins  02637 ( );  Mechanical Traction:         mins  14126;     Timed Treatment:   55   mins   Total Treatment:     55   mins

## 2023-10-12 NOTE — PROGRESS NOTES
Physical Therapy Progress Note    [facility] Physical Therapy  [address]  [address]  [place] [address]      Patient: Fab Reaves   : 1995  Referring practitioner: Angel España MD  Date of Initial Visit: Type: THERAPY  Noted: 2023  Today's Date: 10/12/2023  Patient seen for 32 sessions         Fab Reaves reports: he was absolutely wiped out after yesterday. He went home and slept, and honestly didn't do much as far as exercises at home. His nerve block has worn off, he doesn't need the crutches anymore. He is having some pain but hesitant to take his pain meds.        Subjective     Objective   See Exercise, Manual, and Modality Logs for complete treatment.       Assessment/Plan  Therapist had a lefty discussion with patient today regarding compliance with meds. If he can't tolerate the stretching and mobilization he needs to plan to take something for pain prior to PT. PT also made an effort to address kinesophobia and fear-avoidance behaviors by reassuring him that his surgical repair is stable and can handle the stress of the stretching. He continues to exhibit a substantial amount of swelling and soft tissue hypertonicity in his distal quad. He could achieve 100 degrees of PROM R knee flexion today in a seated position.  Visit Diagnoses:    ICD-10-CM ICD-9-CM   1. S/P MCL repair  Z98.890 V45.89   2. S/P ACL reconstruction  Z98.890 V45.89   3. Decreased range of motion (ROM) of right knee  M25.661 719.56   4. Impaired gait and mobility  R26.89 781.2   5. Decreased strength of lower extremity  R29.898 729.89   6. S/P medial meniscus repair of right knee  Z98.890 V45.89   7. PCL injury, right, subsequent encounter  S89.91XD V58.89     959.7   8. S/P lateral meniscus repair of right knee  Z98.890 V45.89       Progress per Plan of Care           Timed:  Manual Therapy:    30     mins  65883;  Therapeutic Exercise:    15     mins  84533;     Neuromuscular Pollo:        mins   41807;    Therapeutic Activity:     15     mins  44837;     Gait Training:           mins  09160;     Ultrasound:          mins  81076;    Electrical Stimulation:         mins  19647 ( );    Untimed:  Electrical Stimulation:         mins  02083 ( );  Mechanical Traction:         mins  04147;     Timed Treatment:  60    mins   Total Treatment:      60  mins  Gerald Lyn PT, DPT, [facility]  Physical Therapist   [place] License #[id number]

## 2023-10-13 ENCOUNTER — TREATMENT (OUTPATIENT)
Dept: PHYSICAL THERAPY | Facility: CLINIC | Age: 28
End: 2023-10-13
Payer: COMMERCIAL

## 2023-10-13 DIAGNOSIS — R29.898 DECREASED STRENGTH OF LOWER EXTREMITY: ICD-10-CM

## 2023-10-13 DIAGNOSIS — M25.661 DECREASED RANGE OF MOTION (ROM) OF RIGHT KNEE: ICD-10-CM

## 2023-10-13 DIAGNOSIS — S89.91XD PCL INJURY, RIGHT, SUBSEQUENT ENCOUNTER: ICD-10-CM

## 2023-10-13 DIAGNOSIS — Z98.890 S/P MEDIAL MENISCUS REPAIR OF RIGHT KNEE: ICD-10-CM

## 2023-10-13 DIAGNOSIS — Z98.890 S/P LATERAL MENISCUS REPAIR OF RIGHT KNEE: ICD-10-CM

## 2023-10-13 DIAGNOSIS — Z98.890 S/P MCL REPAIR: Primary | ICD-10-CM

## 2023-10-13 DIAGNOSIS — R26.89 IMPAIRED GAIT AND MOBILITY: ICD-10-CM

## 2023-10-13 DIAGNOSIS — Z98.890 S/P ACL RECONSTRUCTION: ICD-10-CM

## 2023-10-13 NOTE — PROGRESS NOTES
Physical Therapy Daily Treatment Note     [place]  [address], [address] [place] [address]       Patient: Fab Reaves   : 1995  Referring practitioner: Angel España MD  Date of Initial Visit: Type: THERAPY  Noted: 2023  Today's Date: 10/13/2023  Patient seen for 33 sessions       Visit Diagnoses:    ICD-10-CM ICD-9-CM   1. S/P MCL repair  Z98.890 V45.89   2. S/P ACL reconstruction  Z98.890 V45.89   3. Decreased range of motion (ROM) of right knee  M25.661 719.56   4. Impaired gait and mobility  R26.89 781.2   5. Decreased strength of lower extremity  R29.898 729.89   6. S/P medial meniscus repair of right knee  Z98.890 V45.89   7. PCL injury, right, subsequent encounter  S89.91XD V58.89     959.7   8. S/P lateral meniscus repair of right knee  Z98.890 V45.89       Subjective Evaluation    History of Present Illness    Subjective comment: feeling much better since manip. still very tight along VMO but more optimistic. admits he has moderate anxiety at times and amalia during PROM but working harder to relax       Objective   See Exercise, Manual, and Modality Logs for complete treatment.       Assessment & Plan       Assessment  Assessment details: Did well today on sci fit for warm up. Added cupping along areas of tension using static and dynamic technique for decompression and distraction from pain.   Also used IASTM amalia at medial quad/VMO.   Able to get to 102 with minimal pressure and pain in the 6-7 range.     Swelling is significant at 36 vs. 43 cm and will track this as baseline.     Pt expressed possible interest in professional help to manage anxiety.     P: work towards 120 asap.           Timed:         Manual Therapy:    30     mins  70014;     Therapeutic Exercise:    15     mins  60451;     Neuromuscular Pollo:        mins  84859;    Therapeutic Activity:          mins  56544;     Gait Training:           mins  60650;     Ultrasound:          mins  18147;    Ionto                                    mins   62086  Self Care                            mins   59930  Canalith Repos         mins 16537  Work hardening   __   min 73538/40221      Un-Timed:  Electrical Stimulation:         mins  08041 ( );  Dry Needling          mins self-pay  Traction          mins 55502      Timed Treatment:   45   mins   Total Treatment:     45   mins    Zorre Zeno Kimura, PT  [place] License: [id number]    [place] License:  [id number]

## 2023-10-16 ENCOUNTER — TREATMENT (OUTPATIENT)
Dept: PHYSICAL THERAPY | Facility: CLINIC | Age: 28
End: 2023-10-16
Payer: COMMERCIAL

## 2023-10-16 DIAGNOSIS — M25.661 DECREASED RANGE OF MOTION (ROM) OF RIGHT KNEE: ICD-10-CM

## 2023-10-16 DIAGNOSIS — Z98.890 S/P MCL REPAIR: Primary | ICD-10-CM

## 2023-10-16 DIAGNOSIS — S89.91XD PCL INJURY, RIGHT, SUBSEQUENT ENCOUNTER: ICD-10-CM

## 2023-10-16 DIAGNOSIS — Z98.890 S/P LATERAL MENISCUS REPAIR OF RIGHT KNEE: ICD-10-CM

## 2023-10-16 DIAGNOSIS — R26.89 IMPAIRED GAIT AND MOBILITY: ICD-10-CM

## 2023-10-16 DIAGNOSIS — Z98.890 S/P MEDIAL MENISCUS REPAIR OF RIGHT KNEE: ICD-10-CM

## 2023-10-16 DIAGNOSIS — R29.898 DECREASED STRENGTH OF LOWER EXTREMITY: ICD-10-CM

## 2023-10-16 DIAGNOSIS — Z98.890 S/P ACL RECONSTRUCTION: ICD-10-CM

## 2023-10-16 PROCEDURE — 97530 THERAPEUTIC ACTIVITIES: CPT | Performed by: PHYSICAL THERAPIST

## 2023-10-16 PROCEDURE — 97140 MANUAL THERAPY 1/> REGIONS: CPT | Performed by: PHYSICAL THERAPIST

## 2023-10-16 NOTE — PROGRESS NOTES
Physical Therapy Progress Note    [facility] Physical Therapy  [address]  [address]  [place] [address]      Patient: Fab Reaves   : 1995  Referring practitioner: Angel España MD  Date of Initial Visit: Type: THERAPY  Noted: 2023  Today's Date: 10/16/2023  Patient seen for 34 sessions         Fab Reaves reports: he's been compliant with steroid pack, but he's not using any kind of pain meds before coming to therapy.        Subjective     Objective   See Exercise, Manual, and Modality Logs for complete treatment.       Assessment/Plan  Fab continues to guard heavily during PROM stretching. He also cannot tolerate full forward revolutions on a recumbent bike, backwards revolutions are better tolerated. He reports tightness in his distal quad and VMO and could not tolerate PROM stretching past 90 degrees of flexion today.  Visit Diagnoses:    ICD-10-CM ICD-9-CM   1. S/P MCL repair  Z98.890 V45.89   2. S/P ACL reconstruction  Z98.890 V45.89   3. Decreased range of motion (ROM) of right knee  M25.661 719.56   4. Impaired gait and mobility  R26.89 781.2   5. Decreased strength of lower extremity  R29.898 729.89   6. S/P medial meniscus repair of right knee  Z98.890 V45.89   7. PCL injury, right, subsequent encounter  S89.91XD V58.89     959.7   8. S/P lateral meniscus repair of right knee  Z98.890 V45.89       Progress per Plan of Care           Timed:  Manual Therapy:    25     mins  02050;  Therapeutic Exercise:         mins  32229;     Neuromuscular Pollo:        mins  82061;    Therapeutic Activity:     25     mins  32549;     Gait Training:           mins  19952;     Ultrasound:          mins  94299;    Electrical Stimulation:         mins  96386 ( );    Untimed:  Electrical Stimulation:         mins  79029 ( );  Mechanical Traction:         mins  10261;     Timed Treatment:   50   mins   Total Treatment:     50   mins  Gerald Lyn PT, DPT, OCS  Physical  Therapist   [place] License #[id number]

## 2023-10-17 ENCOUNTER — TREATMENT (OUTPATIENT)
Dept: PHYSICAL THERAPY | Facility: CLINIC | Age: 28
End: 2023-10-17
Payer: COMMERCIAL

## 2023-10-17 DIAGNOSIS — Z98.890 S/P MEDIAL MENISCUS REPAIR OF RIGHT KNEE: ICD-10-CM

## 2023-10-17 DIAGNOSIS — Z98.890 S/P LATERAL MENISCUS REPAIR OF RIGHT KNEE: ICD-10-CM

## 2023-10-17 DIAGNOSIS — Z98.890 S/P ACL RECONSTRUCTION: Primary | ICD-10-CM

## 2023-10-17 DIAGNOSIS — S89.91XD PCL INJURY, RIGHT, SUBSEQUENT ENCOUNTER: ICD-10-CM

## 2023-10-17 DIAGNOSIS — R26.89 IMPAIRED GAIT AND MOBILITY: ICD-10-CM

## 2023-10-17 DIAGNOSIS — Z98.890 S/P MCL REPAIR: ICD-10-CM

## 2023-10-17 DIAGNOSIS — R29.898 DECREASED STRENGTH OF LOWER EXTREMITY: ICD-10-CM

## 2023-10-17 DIAGNOSIS — M25.661 DECREASED RANGE OF MOTION (ROM) OF RIGHT KNEE: ICD-10-CM

## 2023-10-18 ENCOUNTER — TREATMENT (OUTPATIENT)
Dept: PHYSICAL THERAPY | Facility: CLINIC | Age: 28
End: 2023-10-18
Payer: COMMERCIAL

## 2023-10-18 DIAGNOSIS — Z98.890 S/P MCL REPAIR: ICD-10-CM

## 2023-10-18 DIAGNOSIS — Z98.890 S/P MEDIAL MENISCUS REPAIR OF RIGHT KNEE: ICD-10-CM

## 2023-10-18 DIAGNOSIS — Z98.890 S/P ACL RECONSTRUCTION: Primary | ICD-10-CM

## 2023-10-18 DIAGNOSIS — M25.661 DECREASED RANGE OF MOTION (ROM) OF RIGHT KNEE: ICD-10-CM

## 2023-10-18 DIAGNOSIS — S89.91XD PCL INJURY, RIGHT, SUBSEQUENT ENCOUNTER: ICD-10-CM

## 2023-10-18 DIAGNOSIS — R29.898 DECREASED STRENGTH OF LOWER EXTREMITY: ICD-10-CM

## 2023-10-18 DIAGNOSIS — R26.89 IMPAIRED GAIT AND MOBILITY: ICD-10-CM

## 2023-10-18 DIAGNOSIS — Z98.890 S/P LATERAL MENISCUS REPAIR OF RIGHT KNEE: ICD-10-CM

## 2023-10-18 NOTE — PROGRESS NOTES
Physical Therapy Progress Note    [facility] Physical Therapy  [address]  [address]  [place] [address]      Patient: Fab Reaves   : 1995  Referring practitioner: Angel España MD  Date of Initial Visit: Type: THERAPY  Noted: 2023  Today's Date: 10/18/2023  Patient seen for 36 sessions         Fab Reaves reports: no new complaints, it feels a little better everyday.        Subjective     Objective   See Exercise, Manual, and Modality Logs for complete treatment.       Assessment/Plan  Fab could toelrate PROM R knee flexion stretching to 108 degrees today with significant pain at end range. His swelling is improving but he continues to need cueing for heel to toe gait pattern and to emphasize full knee extension.  Visit Diagnoses:    ICD-10-CM ICD-9-CM   1. S/P ACL reconstruction  Z98.890 V45.89   2. PCL injury, right, subsequent encounter  S89.91XD V58.89     959.7   3. S/P MCL repair  Z98.890 V45.89   4. S/P lateral meniscus repair of right knee  Z98.890 V45.89   5. Decreased range of motion (ROM) of right knee  M25.661 719.56   6. Impaired gait and mobility  R26.89 781.2   7. Decreased strength of lower extremity  R29.898 729.89   8. S/P medial meniscus repair of right knee  Z98.890 V45.89       Progress per Plan of Care           Timed:  Manual Therapy:    25     mins  83719;  Therapeutic Exercise:    25     mins  95698;     Neuromuscular Pollo:        mins  01231;    Therapeutic Activity:          mins  98104;     Gait Training:           mins  00316;     Ultrasound:          mins  75308;    Electrical Stimulation:         mins  61899 ( );    Untimed:  Electrical Stimulation:         mins  58502 ( );  Mechanical Traction:         mins  19105;     Timed Treatment:   50   mins   Total Treatment:     50   mins  Gerald Lyn PT, DPT, [facility]  Physical Therapist   [place] License #[id number]

## 2023-10-19 ENCOUNTER — TREATMENT (OUTPATIENT)
Dept: PHYSICAL THERAPY | Facility: CLINIC | Age: 28
End: 2023-10-19
Payer: COMMERCIAL

## 2023-10-19 DIAGNOSIS — Z98.890 S/P ACL RECONSTRUCTION: Primary | ICD-10-CM

## 2023-10-19 DIAGNOSIS — Z98.890 S/P MEDIAL MENISCUS REPAIR OF RIGHT KNEE: ICD-10-CM

## 2023-10-19 DIAGNOSIS — R29.898 DECREASED STRENGTH OF LOWER EXTREMITY: ICD-10-CM

## 2023-10-19 DIAGNOSIS — Z98.890 S/P MCL REPAIR: ICD-10-CM

## 2023-10-19 DIAGNOSIS — R26.89 IMPAIRED GAIT AND MOBILITY: ICD-10-CM

## 2023-10-19 DIAGNOSIS — M25.661 DECREASED RANGE OF MOTION (ROM) OF RIGHT KNEE: ICD-10-CM

## 2023-10-19 DIAGNOSIS — S89.91XD PCL INJURY, RIGHT, SUBSEQUENT ENCOUNTER: ICD-10-CM

## 2023-10-19 DIAGNOSIS — Z98.890 S/P LATERAL MENISCUS REPAIR OF RIGHT KNEE: ICD-10-CM

## 2023-10-20 ENCOUNTER — TREATMENT (OUTPATIENT)
Dept: PHYSICAL THERAPY | Facility: CLINIC | Age: 28
End: 2023-10-20
Payer: COMMERCIAL

## 2023-10-20 DIAGNOSIS — Z98.890 S/P MEDIAL MENISCUS REPAIR OF RIGHT KNEE: ICD-10-CM

## 2023-10-20 DIAGNOSIS — Z98.890 S/P ACL RECONSTRUCTION: Primary | ICD-10-CM

## 2023-10-20 DIAGNOSIS — M25.661 DECREASED RANGE OF MOTION (ROM) OF RIGHT KNEE: ICD-10-CM

## 2023-10-20 DIAGNOSIS — R29.898 DECREASED STRENGTH OF LOWER EXTREMITY: ICD-10-CM

## 2023-10-20 DIAGNOSIS — S89.91XD PCL INJURY, RIGHT, SUBSEQUENT ENCOUNTER: ICD-10-CM

## 2023-10-20 NOTE — PROGRESS NOTES
Physical Therapy Daily Treatment Note    [facility]  [facility] PHYSICAL THERAPY  [address]  [address]  [facility] [address]  Dept: [phone]  Dept Fax: [phone]  Loc: [phone]  Loc Fax: [phone]     Patient: Fab Reaves   : 1995  Diagnosis/ICD-10 Code:  S/P ACL reconstruction [Z98.890]  Referring practitioner: Angel España MD  Today's Date: 10/20/2023  Patient seen for 38 sessions           Subjective It's doing pretty well today, I think the swelling has improved since surgery.     Objective   See Exercise, Manual, and Modality Logs for complete treatment.   Pt reaching at least 90 deg flexion on recumbent bike with minimal hip hiking compensation. At end of session after cupping- reached 105 deg PROM flexion.    Assessment/Plan Pt able to relax and limit guarding well today during passive ROM. Continued Gua Sha and cupping for VMO relaxation and scar tissue release. Pt may benefit from trigger point dry needling both for trigger point release and also for scar tissue release. Pt to try to schedule with Kay.   Reached           Timed:    Manual Therapy:    15     mins  69528;  Therapeutic Exercise:    15     mins  43477;     Neuromuscular Pollo:    10    mins  56184;    Therapeutic Activity:          mins  43263;     Gait Training:           mins  76675;     Ultrasound:          mins  94378;    Electrical Stimulation:         mins  90701 ( );  Iontophoresis         mins 00669;  Aquatic Therapy         mins 69794;      Untimed:  Electrical Stimulation:         mins  09028 ( );  Traction:         mins  07987;   Dry Needling  (1-2 muscles)                 mins  (Self-pay)  Dry Needling (3-4 muscles)       (Self-pay)  Dry Needling Trial         DRYNDLTRIAL  (No Charge)    Timed Treatment:   40   mins   Total Treatment:     50   mins    Kay Sanabria, PT  Physical Therapist    [place] License:[id number]

## 2023-10-23 ENCOUNTER — TREATMENT (OUTPATIENT)
Dept: PHYSICAL THERAPY | Facility: CLINIC | Age: 28
End: 2023-10-23
Payer: COMMERCIAL

## 2023-10-23 DIAGNOSIS — R29.898 DECREASED STRENGTH OF LOWER EXTREMITY: ICD-10-CM

## 2023-10-23 DIAGNOSIS — Z98.890 S/P MCL REPAIR: ICD-10-CM

## 2023-10-23 DIAGNOSIS — S89.91XD PCL INJURY, RIGHT, SUBSEQUENT ENCOUNTER: ICD-10-CM

## 2023-10-23 DIAGNOSIS — Z98.890 S/P ACL RECONSTRUCTION: Primary | ICD-10-CM

## 2023-10-23 DIAGNOSIS — Z98.890 S/P LATERAL MENISCUS REPAIR OF RIGHT KNEE: ICD-10-CM

## 2023-10-23 DIAGNOSIS — Z98.890 S/P MEDIAL MENISCUS REPAIR OF RIGHT KNEE: ICD-10-CM

## 2023-10-23 DIAGNOSIS — R26.89 IMPAIRED GAIT AND MOBILITY: ICD-10-CM

## 2023-10-23 DIAGNOSIS — M25.661 DECREASED RANGE OF MOTION (ROM) OF RIGHT KNEE: ICD-10-CM

## 2023-10-23 PROCEDURE — 97140 MANUAL THERAPY 1/> REGIONS: CPT | Performed by: PHYSICAL THERAPIST

## 2023-10-23 PROCEDURE — 97530 THERAPEUTIC ACTIVITIES: CPT | Performed by: PHYSICAL THERAPIST

## 2023-10-23 PROCEDURE — 97110 THERAPEUTIC EXERCISES: CPT | Performed by: PHYSICAL THERAPIST

## 2023-10-23 NOTE — PROGRESS NOTES
Physical Therapy Progress Note    [facility] Physical Therapy  [address]  [address]  [place] [address]      Patient: Fab Reaves   : 1995  Referring practitioner: Angel España MD  Date of Initial Visit: Type: THERAPY  Noted: 2023  Today's Date: 10/23/2023  Patient seen for 39 sessions         Fab Reaves reports: the knee feels really good today.        Subjective     Objective   See Exercise, Manual, and Modality Logs for complete treatment.       Assessment/Plan  Fab is tolerating a recumbent bike well for warmup and is able to slowly move the seat closer to the pedals for increased knee flexion. He continues to exhibit distal quad tightness but could achieve 110 degrees of PROM knee flexion today after soft tissue mobilization to his quad, extensive joint mobilizations and PROM stretching. He requires considerable volume of stretching and joint mobilization to loosen up.   Visit Diagnoses:    ICD-10-CM ICD-9-CM   1. S/P ACL reconstruction  Z98.890 V45.89   2. Decreased range of motion (ROM) of right knee  M25.661 719.56   3. Impaired gait and mobility  R26.89 781.2   4. Decreased strength of lower extremity  R29.898 729.89   5. S/P medial meniscus repair of right knee  Z98.890 V45.89   6. PCL injury, right, subsequent encounter  S89.91XD V58.89     959.7   7. S/P MCL repair  Z98.890 V45.89   8. S/P lateral meniscus repair of right knee  Z98.890 V45.89       Progress per Plan of Care           Timed:  Manual Therapy:    25     mins  39523;  Therapeutic Exercise:    15     mins  76872;     Neuromuscular Pollo:        mins  28912;    Therapeutic Activity:     10     mins  17814;     Gait Training:           mins  57753;     Ultrasound:          mins  83613;    Electrical Stimulation:         mins  94311 ( );    Untimed:  Electrical Stimulation:         mins  21099 ( );  Mechanical Traction:         mins  28473;     Timed Treatment:   50   mins   Total  Treatment:     50   mins  Gerald Lyn PT, DPT, OCS  Physical Therapist   [place] License #[id number]

## 2023-10-24 ENCOUNTER — TREATMENT (OUTPATIENT)
Dept: PHYSICAL THERAPY | Facility: CLINIC | Age: 28
End: 2023-10-24
Payer: COMMERCIAL

## 2023-10-24 DIAGNOSIS — M25.661 DECREASED RANGE OF MOTION (ROM) OF RIGHT KNEE: ICD-10-CM

## 2023-10-24 DIAGNOSIS — Z98.890 S/P ACL RECONSTRUCTION: Primary | ICD-10-CM

## 2023-10-24 DIAGNOSIS — R26.89 IMPAIRED GAIT AND MOBILITY: ICD-10-CM

## 2023-10-24 DIAGNOSIS — R29.898 DECREASED STRENGTH OF LOWER EXTREMITY: ICD-10-CM

## 2023-10-24 PROCEDURE — 20560 NDL INSJ W/O NJX 1 OR 2 MUSC: CPT | Performed by: PHYSICAL THERAPIST

## 2023-10-24 NOTE — PROGRESS NOTES
Physical Therapy Daily Treatment Note    [facility]  [facility] PHYSICAL THERAPY  [address]  [address]  [facility] [address]  Dept: [phone]  Dept Fax: [phone]  Loc: [phone]  Loc Fax: [phone]     Patient: Fab Reaves   : 1995  Diagnosis/ICD-10 Code:  S/P ACL reconstruction [Z98.890]  Referring practitioner: Angel España MD  Today's Date: 10/24/2023  Patient seen for 40 sessions           Subjective Yesterday was a really good therapy day, got to 110 deg. I think a lot of that tightness in my (VMO) feels better. Do want to try the dry needling.     Objective   See Exercise, Manual, and Modality Logs for complete treatment.   After discussion of risk and benefits, obtained written and verbal consent.     Assessment/Plan Pt tolerated dry needling well and will likely have benefit including decreased pain, improved mobility, and ease of stretching. May benefit from more sessions in the future.           Timed:    Manual Therapy:         mins  50849;  Therapeutic Exercise:         mins  94578;     Neuromuscular Pollo:        mins  99044;    Therapeutic Activity:          mins  77960;     Gait Training:           mins  99466;     Ultrasound:          mins  58742;    Electrical Stimulation:         mins  71429 ( );  Iontophoresis         mins 59859;  Aquatic Therapy         mins 03674;      Untimed:  Electrical Stimulation:         mins  55896 ( );  Traction:         mins  53555;   Dry Needling  (1-2 muscles)             15    mins  (Self-pay)  Dry Needling (3-4 muscles)       (Self-pay)  Dry Needling Trial         DRYNDLTRIAL  (No Charge)    Timed Treatment:      mins   Total Treatment:     30   mins    Kay Sanabria, PT  Physical Therapist    [place] License:[id number]

## 2023-10-25 NOTE — PROGRESS NOTES
Physical Therapy Progress Note    [facility] Physical Therapy  [address]  [address]  [place] [address]      Patient: Fab Reaves   : 1995  Referring practitioner: Angel España MD  Date of Initial Visit: Type: THERAPY  Noted: 2023  Today's Date: 10/25/2023  Patient seen for 35 sessions         Fab Reaves reports: he didn't take any pain meds today, he has to  his daughter when he leaves this appointment.        Subjective     Objective   See Exercise, Manual, and Modality Logs for complete treatment.       Assessment/Plan  Fab could only achieve about 95 degrees of knee flexion today with considerable pain at end range. He gets better ROM results when he takes a pain pill prior to therapy. He was encouraged to keep working on ROM once per hour at home.  Visit Diagnoses:    ICD-10-CM ICD-9-CM   1. S/P ACL reconstruction  Z98.890 V45.89   2. S/P MCL repair  Z98.890 V45.89   3. S/P lateral meniscus repair of right knee  Z98.890 V45.89   4. Decreased range of motion (ROM) of right knee  M25.661 719.56   5. Impaired gait and mobility  R26.89 781.2   6. Decreased strength of lower extremity  R29.898 729.89   7. S/P medial meniscus repair of right knee  Z98.890 V45.89   8. PCL injury, right, subsequent encounter  S89.91XD V58.89     959.7       Progress per Plan of Care           Timed:  Manual Therapy:    25     mins  86511;  Therapeutic Exercise:    25     mins  86750;     Neuromuscular Pollo:        mins  30834;    Therapeutic Activity:          mins  67306;     Gait Training:           mins  83118;     Ultrasound:          mins  11796;    Electrical Stimulation:         mins  23325 ( );    Untimed:  Electrical Stimulation:         mins  45577 ( );  Mechanical Traction:         mins  61969;     Timed Treatment:   50   mins   Total Treatment:     50   mins  Gerald Lyn PT, DPT, OCS  Physical Therapist   [place] License #[id number]

## 2023-10-26 NOTE — PROGRESS NOTES
Physical Therapy Progress Note    [facility] Physical Therapy  [address]  [address]  [place] [address]      Patient: Fab Reaves   : 1995  Referring practitioner: Angel España MD  Date of Initial Visit: Type: THERAPY  Noted: 2023  Today's Date: 10/26/2023  Patient seen for 37 sessions         Fab Reaves reports: no new complaints.        Subjective     Objective   See Exercise, Manual, and Modality Logs for complete treatment.       Assessment/Plan  Fab responds well to aggressive tibiofemoral joint mobilizations anterior and posterior to improve his knee flexion/extension, and also requires significant soft tissue mobilization to his distal quad to improve his knee flexion PROM. He exhibits 105 degrees of PROM R knee flexion today and needs to lie prone with therapist flexing his knee to avoid hip compensations.  Visit Diagnoses:    ICD-10-CM ICD-9-CM   1. S/P ACL reconstruction  Z98.890 V45.89   2. S/P medial meniscus repair of right knee  Z98.890 V45.89   3. PCL injury, right, subsequent encounter  S89.91XD V58.89     959.7   4. S/P MCL repair  Z98.890 V45.89   5. S/P lateral meniscus repair of right knee  Z98.890 V45.89   6. Impaired gait and mobility  R26.89 781.2   7. Decreased range of motion (ROM) of right knee  M25.661 719.56   8. Decreased strength of lower extremity  R29.898 729.89       Progress per Plan of Care           Timed:  Manual Therapy:    25     mins  61294;  Therapeutic Exercise:    25     mins  56875;     Neuromuscular Pollo:        mins  96093;    Therapeutic Activity:          mins  57534;     Gait Training:           mins  68853;     Ultrasound:          mins  27277;    Electrical Stimulation:         mins  81414 ( );    Untimed:  Electrical Stimulation:         mins  36795 ( );  Mechanical Traction:         mins  49824;     Timed Treatment:   50   mins   Total Treatment:     50   mins  Gerald Lyn PT, DPT, OCS  Physical  Therapist   [place] License #[id number]

## 2023-10-27 ENCOUNTER — TREATMENT (OUTPATIENT)
Dept: PHYSICAL THERAPY | Facility: CLINIC | Age: 28
End: 2023-10-27
Payer: COMMERCIAL

## 2023-10-27 DIAGNOSIS — S89.91XD PCL INJURY, RIGHT, SUBSEQUENT ENCOUNTER: ICD-10-CM

## 2023-10-27 DIAGNOSIS — Z98.890 S/P LATERAL MENISCUS REPAIR OF RIGHT KNEE: ICD-10-CM

## 2023-10-27 DIAGNOSIS — Z98.890 S/P MCL REPAIR: ICD-10-CM

## 2023-10-27 DIAGNOSIS — M25.661 DECREASED RANGE OF MOTION (ROM) OF RIGHT KNEE: ICD-10-CM

## 2023-10-27 DIAGNOSIS — R29.898 DECREASED STRENGTH OF LOWER EXTREMITY: ICD-10-CM

## 2023-10-27 DIAGNOSIS — Z98.890 S/P MEDIAL MENISCUS REPAIR OF RIGHT KNEE: ICD-10-CM

## 2023-10-27 DIAGNOSIS — R26.89 IMPAIRED GAIT AND MOBILITY: ICD-10-CM

## 2023-10-27 DIAGNOSIS — Z98.890 S/P ACL RECONSTRUCTION: Primary | ICD-10-CM

## 2023-10-27 NOTE — PROGRESS NOTES
Physical Therapy Progress Note    [facility] Physical Therapy  [address]  [address]  [place] [address]      Patient: Fab Reaves   : 1995  Referring practitioner: Angel España MD  Date of Initial Visit: Type: THERAPY  Noted: 2023  Today's Date: 10/27/2023  Patient seen for 41 sessions         Fab Reaves reports: surgeon is releasing him to return to work.         Subjective     Objective   See Exercise, Manual, and Modality Logs for complete treatment.       Assessment/Plan  Increased knee joint edema noted today. He was educated to get back into the habit of elevating and icing it, especially if he's going to be going back to work in some capacity. After aggressive soft tissue mobilization and TFJ mobs along with passive stretching, he could achieve 110 degrees of passive knee flexion ROM today.  Visit Diagnoses:    ICD-10-CM ICD-9-CM   1. S/P ACL reconstruction  Z98.890 V45.89   2. Impaired gait and mobility  R26.89 781.2   3. S/P lateral meniscus repair of right knee  Z98.890 V45.89   4. Decreased range of motion (ROM) of right knee  M25.661 719.56   5. S/P medial meniscus repair of right knee  Z98.890 V45.89   6. PCL injury, right, subsequent encounter  S89.91XD V58.89     959.7   7. Decreased strength of lower extremity  R29.898 729.89   8. S/P MCL repair  Z98.890 V45.89       Progress per Plan of Care           Timed:  Manual Therapy:    25     mins  08409;  Therapeutic Exercise:    25     mins  48973;     Neuromuscular Pollo:        mins  84895;    Therapeutic Activity:         mins  62510;     Gait Training:           mins  11978;     Ultrasound:          mins  07194;    Electrical Stimulation:         mins  03992 ( );    Untimed:  Electrical Stimulation:         mins  07953 ( );  Mechanical Traction:         mins  39543;     Timed Treatment:   50   mins   Total Treatment:     50   mins  Gerald Lyn PT, DPT, OCS  Physical Therapist   [place] License  #[id number]

## 2023-11-01 ENCOUNTER — TREATMENT (OUTPATIENT)
Dept: PHYSICAL THERAPY | Facility: CLINIC | Age: 28
End: 2023-11-01
Payer: COMMERCIAL

## 2023-11-01 DIAGNOSIS — R26.89 IMPAIRED GAIT AND MOBILITY: ICD-10-CM

## 2023-11-01 DIAGNOSIS — Z98.890 S/P ACL RECONSTRUCTION: Primary | ICD-10-CM

## 2023-11-01 DIAGNOSIS — M25.661 DECREASED RANGE OF MOTION (ROM) OF RIGHT KNEE: ICD-10-CM

## 2023-11-01 DIAGNOSIS — Z98.890 S/P LATERAL MENISCUS REPAIR OF RIGHT KNEE: ICD-10-CM

## 2023-11-01 PROCEDURE — 97110 THERAPEUTIC EXERCISES: CPT | Performed by: PHYSICAL THERAPIST

## 2023-11-01 PROCEDURE — 97140 MANUAL THERAPY 1/> REGIONS: CPT | Performed by: PHYSICAL THERAPIST

## 2023-11-01 NOTE — PROGRESS NOTES
Physical Therapy Daily Treatment Note      Patient: Fab Reaves   : 1995  Referring practitioner: Angel España MD  Date of Initial Visit: Type: THERAPY  Noted: 2023  Today's Date: 2023  Patient seen for 42 sessions       Visit Diagnoses:    ICD-10-CM ICD-9-CM   1. S/P ACL reconstruction  Z98.890 V45.89   2. Impaired gait and mobility  R26.89 781.2   3. S/P lateral meniscus repair of right knee  Z98.890 V45.89   4. Decreased range of motion (ROM) of right knee  M25.661 719.56       Subjective   No new concerns.     Objective   See Exercise, Manual, and Modality Logs for complete treatment.       Assessment/Plan    Subjectively, pt reports no increase of pain or discomfort with interventions performed today. Performed well with continued functional strengthening interventions. Continues to demonstrate good tolerance to exercise progressions. Continues to benefit from verbal/tactile cues to ensure proper form and technique for exercise performance.        Timed:         Manual Therapy:    25     mins  16929;     Therapeutic Exercise:    25     mins  03581;     Neuromuscular Pollo:    0    mins  03688;    Therapeutic Activity:     0     mins  16711;     Gait Trainin     mins  01019;     Ultrasound:     0     mins  65452;    Ionto                               0    mins   61004  Self Care                       0     mins   38016  Canalith Repos    0     mins 80180      Un-Timed:  Electrical Stimulation:    0     mins  58098 ( );  Dry Needling     0     mins self-pay  Traction     0     mins 90141      Timed Treatment:   50   mins   Total Treatment:     50   mins    Mercedes Fox, PT  [place] License: [id number]

## 2023-11-07 ENCOUNTER — TREATMENT (OUTPATIENT)
Dept: PHYSICAL THERAPY | Facility: CLINIC | Age: 28
End: 2023-11-07
Payer: COMMERCIAL

## 2023-11-07 DIAGNOSIS — Z98.890 S/P MEDIAL MENISCUS REPAIR OF RIGHT KNEE: ICD-10-CM

## 2023-11-07 DIAGNOSIS — Z98.890 S/P MCL REPAIR: ICD-10-CM

## 2023-11-07 DIAGNOSIS — R26.89 IMPAIRED GAIT AND MOBILITY: ICD-10-CM

## 2023-11-07 DIAGNOSIS — S89.91XD PCL INJURY, RIGHT, SUBSEQUENT ENCOUNTER: ICD-10-CM

## 2023-11-07 DIAGNOSIS — Z98.890 S/P ACL RECONSTRUCTION: Primary | ICD-10-CM

## 2023-11-07 DIAGNOSIS — R29.898 DECREASED STRENGTH OF LOWER EXTREMITY: ICD-10-CM

## 2023-11-07 DIAGNOSIS — M25.661 DECREASED RANGE OF MOTION (ROM) OF RIGHT KNEE: ICD-10-CM

## 2023-11-07 DIAGNOSIS — Z98.890 S/P LATERAL MENISCUS REPAIR OF RIGHT KNEE: ICD-10-CM

## 2023-11-07 PROCEDURE — 97110 THERAPEUTIC EXERCISES: CPT | Performed by: PHYSICAL THERAPIST

## 2023-11-07 PROCEDURE — 97530 THERAPEUTIC ACTIVITIES: CPT | Performed by: PHYSICAL THERAPIST

## 2023-11-07 PROCEDURE — 97140 MANUAL THERAPY 1/> REGIONS: CPT | Performed by: PHYSICAL THERAPIST

## 2023-11-07 NOTE — PROGRESS NOTES
Physical Therapy Daily Treatment Note  [facility] Physical Therapy [place]   [address]  [place] [address]  P: [phone]       F: [phone]    Patient: Fab Reaves   : 1995  Diagnosis/ICD-10 Code:  S/P ACL reconstruction [Z98.890]  Referring practitioner: Angel España MD  Date of Initial Visit: Type: THERAPY  Noted: 2023  Today's Date: 2023  Patient seen for 43 sessions       Fab Reaves reports: doing better. 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     Subjective     Objective   See Exercise, Manual, and Modality Logs for complete treatment.       Assessment/Plan  Subjectively, pt reports no increase of pain or discomfort with interventions performed today. Performed well with continued knee mobility interventions. Continues to demonstrate fatigue with lunge activity today. Continues to benefit from verbal/tactile cues to ensure proper form and technique for exercise performance.     Progress per Plan of Care           Manual Therapy:    10     mins  02871;  Therapeutic Exercise:    10     mins  12554;     Neuromuscular Pollo:        mins  22305;    Therapeutic Activity:     15     mins  97963;     Gait Training:           mins  14765;     Ultrasound:          mins  36549;    Electrical Stimulation:         mins  71256;  Traction          mins 36554    Timed Treatment:   35   mins   Total Treatment:     35   mins    Eva Jenkins PTA  Physical Therapist Assistant [id number]

## 2023-11-09 ENCOUNTER — TREATMENT (OUTPATIENT)
Dept: PHYSICAL THERAPY | Facility: CLINIC | Age: 28
End: 2023-11-09
Payer: COMMERCIAL

## 2023-11-09 DIAGNOSIS — M25.661 DECREASED RANGE OF MOTION (ROM) OF RIGHT KNEE: ICD-10-CM

## 2023-11-09 DIAGNOSIS — R29.898 DECREASED STRENGTH OF LOWER EXTREMITY: ICD-10-CM

## 2023-11-09 DIAGNOSIS — Z98.890 S/P LATERAL MENISCUS REPAIR OF RIGHT KNEE: ICD-10-CM

## 2023-11-09 DIAGNOSIS — Z98.890 S/P ACL RECONSTRUCTION: Primary | ICD-10-CM

## 2023-11-09 DIAGNOSIS — R26.89 IMPAIRED GAIT AND MOBILITY: ICD-10-CM

## 2023-11-09 DIAGNOSIS — S89.91XD PCL INJURY, RIGHT, SUBSEQUENT ENCOUNTER: ICD-10-CM

## 2023-11-09 DIAGNOSIS — Z98.890 S/P MEDIAL MENISCUS REPAIR OF RIGHT KNEE: ICD-10-CM

## 2023-11-13 ENCOUNTER — TREATMENT (OUTPATIENT)
Dept: PHYSICAL THERAPY | Facility: CLINIC | Age: 28
End: 2023-11-13
Payer: COMMERCIAL

## 2023-11-13 DIAGNOSIS — Z98.890 S/P ACL RECONSTRUCTION: Primary | ICD-10-CM

## 2023-11-13 DIAGNOSIS — Z98.890 S/P LATERAL MENISCUS REPAIR OF RIGHT KNEE: ICD-10-CM

## 2023-11-13 DIAGNOSIS — Z98.890 S/P MEDIAL MENISCUS REPAIR OF RIGHT KNEE: ICD-10-CM

## 2023-11-13 DIAGNOSIS — R29.898 DECREASED STRENGTH OF LOWER EXTREMITY: ICD-10-CM

## 2023-11-13 DIAGNOSIS — M25.661 DECREASED RANGE OF MOTION (ROM) OF RIGHT KNEE: ICD-10-CM

## 2023-11-13 DIAGNOSIS — S89.91XD PCL INJURY, RIGHT, SUBSEQUENT ENCOUNTER: ICD-10-CM

## 2023-11-13 DIAGNOSIS — R26.89 IMPAIRED GAIT AND MOBILITY: ICD-10-CM

## 2023-11-13 DIAGNOSIS — Z98.890 S/P MCL REPAIR: ICD-10-CM

## 2023-11-13 PROCEDURE — 97140 MANUAL THERAPY 1/> REGIONS: CPT | Performed by: PHYSICAL THERAPIST

## 2023-11-13 PROCEDURE — 97110 THERAPEUTIC EXERCISES: CPT | Performed by: PHYSICAL THERAPIST

## 2023-11-13 NOTE — PROGRESS NOTES
Physical Therapy Daily Treatment Note     [place]  [address], [address] [place] [address]       Patient: Fab Reaves   : 1995  Referring practitioner: Angel España MD  Date of Initial Visit: Type: THERAPY  Noted: 2023  Today's Date: 2023  Patient seen for 45 sessions       Visit Diagnoses:    ICD-10-CM ICD-9-CM   1. S/P ACL reconstruction  Z98.890 V45.89   2. Impaired gait and mobility  R26.89 781.2   3. S/P lateral meniscus repair of right knee  Z98.890 V45.89   4. Decreased range of motion (ROM) of right knee  M25.661 719.56   5. S/P medial meniscus repair of right knee  Z98.890 V45.89   6. PCL injury, right, subsequent encounter  S89.91XD V58.89     959.7   7. Decreased strength of lower extremity  R29.898 729.89   8. S/P MCL repair  Z98.890 V45.89       Subjective Evaluation    History of Present Illness    Subjective comment: work going well. getting down to the ground okay and climbing ladders okay at work. Biggest issue is crawling on his knees or having to squat low for certain jobs.     Objective   See Exercise, Manual, and Modality Logs for complete treatment.   115 flexion at end of session today    Assessment & Plan       Assessment  Assessment details: Pt doing well today adding on TRX and sled squats. Also repeated sit to stands on ply box and also with small bench doing ball toss to trampoline.   Good job with RDLs and low lunge walks.   Also working on more quad endurance with elliptical and airdyne bike today    Easy mobs got pt to 115 with minimal push.   Also added on hip flexor drop off stretch for lateral quad ITB tension.           Timed:         Manual Therapy:    15     mins  29346;     Therapeutic Exercise:    45     mins  98937;     Neuromuscular Pollo:        mins  76268;    Therapeutic Activity:          mins  54600;     Gait Training:           mins  54114;     Ultrasound:          mins  26687;    Ionto                                   mins    95851  Self Care                            mins   71777  Canalith Repos         mins 33703  Work hardening   __   min 03095/54573      Un-Timed:  Electrical Stimulation:         mins  65302 ( );  Dry Needling          mins self-pay  Traction          mins 07596      Timed Treatment:      60 mins   Total Treatment:     60   mins    Zorre Zeno Kimura, PT  [place] License: [id number]    [place] License:  [id number]

## 2023-11-16 NOTE — PROGRESS NOTES
Physical Therapy Daily Note    Patient: Fab Reaves   : 1995  Diagnosis/ICD-10 Code:  S/P ACL reconstruction [Z98.890]  Referring practitioner: Angel España MD  Date of Initial Visit: Type: THERAPY  Noted: 2023  Today's Date: 2023  Patient seen for 44 session  Location of Service: [place] [address]       Visit Diagnoses:    ICD-10-CM ICD-9-CM   1. S/P ACL reconstruction  Z98.890 V45.89   2. Impaired gait and mobility  R26.89 781.2   3. S/P lateral meniscus repair of right knee  Z98.890 V45.89   4. Decreased range of motion (ROM) of right knee  M25.661 719.56   5. S/P medial meniscus repair of right knee  Z98.890 V45.89   6. PCL injury, right, subsequent encounter  S89.91XD V58.89     959.7   7. Decreased strength of lower extremity  R29.898 729.89            Subjective  Fab Reaves reported today that he feels like things are improving. Still having a number of issues, but feels like it's going in the right direction    Objective   No functional measures updated at today's visit unless otherwise stated. For functional assessment and documentation of patient progressions referred to the assessment section.    See Exercise, Manual, and Modality Logs for complete treatments.       Assessment/Plan  Treatment today continued with emphasis of end range mobility interventions and functional strengthening to promoted improved functional mobility and independence with ADLs and community activities. Pt continues to report difficulty and limitations with knee mobility and LE strength, though tolerance to interventions appears to be improving per gross assessment and patient reports in the clinic. Pt continues to have limitations in functional strength and mobility, and will continue to benefit from ongoing skilled PT to help pt continue to progress towards current LTGs and return to PLOF.    Plan: Continue with current treatment plan and  progressions to reach goals established and previous evaluation/assessment         Timed:         Manual Therapy:         mins  69504;     Therapeutic Exercise:    30     mins  03118;     Neuromuscular Pollo:    15    mins  12589;    Therapeutic Activity:     10     mins  86376;     Gait Training:           mins  27544;     Ultrasound:          mins  70666;    Ionto                                   mins   62958  Self Care                            mins   23608  Canalith Repos         mins 27193    Un-Timed:  Electrical Stimulation:         mins  59437 ( );  Dry Needling          mins self-pay  Traction          mins 57943  Low Eval          Mins  80487  Mod Eval          Mins  48656  High Eval                            Mins  84058      Timed Treatment:   55   mins   Total Treatment:     55   mins      PT: Robert Jeronimo PT     License Number: [id number]  Electronically signed by Robert Jeronimo PT, 11/16/23, 12:22 PM EST

## 2023-11-27 ENCOUNTER — TREATMENT (OUTPATIENT)
Dept: PHYSICAL THERAPY | Facility: CLINIC | Age: 28
End: 2023-11-27
Payer: COMMERCIAL

## 2023-11-27 DIAGNOSIS — M25.661 DECREASED RANGE OF MOTION (ROM) OF RIGHT KNEE: ICD-10-CM

## 2023-11-27 DIAGNOSIS — R26.89 IMPAIRED GAIT AND MOBILITY: ICD-10-CM

## 2023-11-27 DIAGNOSIS — S89.91XD PCL INJURY, RIGHT, SUBSEQUENT ENCOUNTER: ICD-10-CM

## 2023-11-27 DIAGNOSIS — Z98.890 S/P ACL RECONSTRUCTION: Primary | ICD-10-CM

## 2023-11-27 DIAGNOSIS — R29.898 DECREASED STRENGTH OF LOWER EXTREMITY: ICD-10-CM

## 2023-11-27 DIAGNOSIS — Z98.890 S/P MEDIAL MENISCUS REPAIR OF RIGHT KNEE: ICD-10-CM

## 2023-11-27 DIAGNOSIS — Z98.890 S/P MCL REPAIR: ICD-10-CM

## 2023-11-27 DIAGNOSIS — Z98.890 S/P LATERAL MENISCUS REPAIR OF RIGHT KNEE: ICD-10-CM

## 2023-11-27 PROCEDURE — 97110 THERAPEUTIC EXERCISES: CPT | Performed by: PHYSICAL THERAPIST

## 2023-11-27 PROCEDURE — 97140 MANUAL THERAPY 1/> REGIONS: CPT | Performed by: PHYSICAL THERAPIST

## 2023-11-27 NOTE — PROGRESS NOTES
Physical Therapy Daily Treatment Note/30 day re-assess     [facility]  [address], [address] [place] [address]       Patient: Fab Reaves   : 1995  Referring practitioner: Angel España MD  Date of Initial Visit: Type: THERAPY  Noted: 2023  Today's Date: 2023  Patient seen for 46 sessions       Visit Diagnoses:    ICD-10-CM ICD-9-CM   1. S/P ACL reconstruction  Z98.890 V45.89   2. Impaired gait and mobility  R26.89 781.2   3. S/P lateral meniscus repair of right knee  Z98.890 V45.89   4. Decreased range of motion (ROM) of right knee  M25.661 719.56   5. S/P medial meniscus repair of right knee  Z98.890 V45.89   6. PCL injury, right, subsequent encounter  S89.91XD V58.89     959.7   7. Decreased strength of lower extremity  R29.898 729.89   8. S/P MCL repair  Z98.890 V45.89       Subjective Evaluation    History of Present Illness    Subjective comment: work going well. climbing ladders okay and not having to squat and kneel too much. brace working well.       Objective   See Exercise, Manual, and Modality Logs for complete treatment.       Assessment & Plan       Assessment  Assessment details: Pt seen for his 46th session today. Tired after long holiday weekend then back to work today. Knee is tight but only lost 3 degrees from 115 to 112.  Most goals met but still can't kneel to stand well which is expected with his loss of ROM and strength.  Leg press test today was 80 vs. 120# 10 rep near max. This comes out to 66% and gives us a baseline. 66% is good at this stage of rehab. Goal will be to get to 80% at dc. ROM goal of 130 is tough and 120 more realistic.   Need more hamstring resistive ex    Pt changing insurance [employer] 23.   Not sure when next ortho visit is?      Short Term Goals (4 wks):  1.  Patient will have increased right knee flexion to 90 degrees. (met)  2.  Patient will have increased right quadriceps/VMO muscle activation to WFL. (Met)  3.  Patient will  have decreased right knee swelling by 2 cm. (Met)  4.  Patient will be independent in HEP. (Met)     Short Term Goals (8 wks):  5.  Patient will have normalized gait pattern without AD. (MET)  6.  Patient will have increased right knee flexion ROM to 130 degrees. (Not met 112 today)  7.  Patient will have increased right knee strength to 5/5. (Not met. Leg press 66%. Strength 4-/5)  8.  Patient will have patellar mobility WNL. (met)      Long Term Goals (12 wks):  1.  Patient will be able to perform functional squat patterns with proper form. (Partially met unless really low)  2.  Patient will be able to ascend/descend stairs with reciprocal pattern. (Partially met. Descending well but still some weakness noted with ascending)  3.  Patient will have improved LEFS score of 60/80 or better. (MET 61/80)  4.  Patient will have SLS time of 30 seconds with steady posture. (met)  5.  Patient will be able to transition from standing to kneeling and return to standing.  (Not met)    Timed:         Manual Therapy:    10     mins  03046;     Therapeutic Exercise:   30     mins  79835;     Neuromuscular Pollo:       mins  08619;    Therapeutic Activity:          mins  87165;     Gait Training:           mins  35915;     Ultrasound:          mins  00372;    Ionto                                   mins   00505  Self Care                            mins   08081  Canalith Repos         mins 71274  Work hardening   __   min 49726/26567      Un-Timed:  Electrical Stimulation:         mins  47698 ( );  Dry Needling          mins self-pay  Traction          mins 11501      Timed Treatment:   40   mins   Total Treatment:     45   mins    Zorre Zeno Kimura, PT [place] License: [id number]    [place] License:  [id number]

## 2023-11-29 ENCOUNTER — TREATMENT (OUTPATIENT)
Dept: PHYSICAL THERAPY | Facility: CLINIC | Age: 28
End: 2023-11-29
Payer: COMMERCIAL

## 2023-11-29 DIAGNOSIS — Z98.890 S/P LATERAL MENISCUS REPAIR OF RIGHT KNEE: ICD-10-CM

## 2023-11-29 DIAGNOSIS — Z98.890 S/P ACL RECONSTRUCTION: Primary | ICD-10-CM

## 2023-11-29 DIAGNOSIS — M25.661 DECREASED RANGE OF MOTION (ROM) OF RIGHT KNEE: ICD-10-CM

## 2023-11-29 DIAGNOSIS — Z98.890 S/P MCL REPAIR: ICD-10-CM

## 2023-11-29 DIAGNOSIS — R26.89 IMPAIRED GAIT AND MOBILITY: ICD-10-CM

## 2023-11-29 DIAGNOSIS — S89.91XD PCL INJURY, RIGHT, SUBSEQUENT ENCOUNTER: ICD-10-CM

## 2023-11-29 DIAGNOSIS — R29.898 DECREASED STRENGTH OF LOWER EXTREMITY: ICD-10-CM

## 2023-11-29 DIAGNOSIS — Z98.890 S/P MEDIAL MENISCUS REPAIR OF RIGHT KNEE: ICD-10-CM

## 2023-11-29 NOTE — PROGRESS NOTES
Physical Therapy Daily Treatment Note      Patient: Fab Reaves   : 1995  Referring practitioner: Angel España MD  Date of Initial Visit: Type: THERAPY  Noted: 2023  Today's Date: 2023  Patient seen for 47 sessions       Visit Diagnoses:    ICD-10-CM ICD-9-CM   1. S/P ACL reconstruction  Z98.890 V45.89   2. Impaired gait and mobility  R26.89 781.2   3. S/P lateral meniscus repair of right knee  Z98.890 V45.89       Subjective   Pt states his insurance is changing so he has to transfer to a different facility after today's visit.     Objective   See Exercise, Manual, and Modality Logs for complete treatment.       Assessment/Plan    Discussed HEP to perform until he is able to get in with another facility. Will D/C this course of care at this time.         Timed:         Manual Therapy:    15     mins  71247;     Therapeutic Exercise:    10     mins  79755;     Neuromuscular Pollo:    10    mins  98606;    Therapeutic Activity:     10     mins  35527;     Gait Trainin     mins  65783;     Ultrasound:     0     mins  53775;    Ionto                               0    mins   65920  Self Care                       0     mins   04275  Canalith Repos    0     mins 92061      Un-Timed:  Electrical Stimulation:    0     mins  08722 ( );  Dry Needling     0     mins self-pay  Traction     0     mins 03231      Timed Treatment:   45   mins   Total Treatment:     45   mins    Mercedes Fox, PT  [place] License: [id number]